# Patient Record
Sex: FEMALE | Race: WHITE | NOT HISPANIC OR LATINO | Employment: OTHER | URBAN - METROPOLITAN AREA
[De-identification: names, ages, dates, MRNs, and addresses within clinical notes are randomized per-mention and may not be internally consistent; named-entity substitution may affect disease eponyms.]

---

## 2017-03-20 PROBLEM — M25.561 ACUTE PAIN OF RIGHT KNEE: Status: ACTIVE | Noted: 2017-03-20

## 2017-03-29 PROBLEM — M17.0 PRIMARY OSTEOARTHRITIS OF BOTH KNEES: Status: ACTIVE | Noted: 2017-03-29

## 2017-03-29 PROBLEM — M76.31 ILIOTIBIAL BAND SYNDROME OF RIGHT SIDE: Status: ACTIVE | Noted: 2017-03-29

## 2017-09-19 ENCOUNTER — OFFICE VISIT (OUTPATIENT)
Dept: PHYSICAL MEDICINE AND REHAB | Facility: CLINIC | Age: 72
End: 2017-09-19
Payer: MEDICARE

## 2017-09-19 VITALS
HEIGHT: 64 IN | WEIGHT: 203 LBS | DIASTOLIC BLOOD PRESSURE: 71 MMHG | HEART RATE: 75 BPM | SYSTOLIC BLOOD PRESSURE: 125 MMHG | BODY MASS INDEX: 34.66 KG/M2

## 2017-09-19 DIAGNOSIS — M79.604 RIGHT LEG PAIN: Primary | ICD-10-CM

## 2017-09-19 DIAGNOSIS — M76.31 IT BAND SYNDROME, RIGHT: ICD-10-CM

## 2017-09-19 PROCEDURE — 99999 PR PBB SHADOW E&M-EST. PATIENT-LVL II: CPT | Mod: PBBFAC,,, | Performed by: PHYSICAL MEDICINE & REHABILITATION

## 2017-09-19 PROCEDURE — 3078F DIAST BP <80 MM HG: CPT | Mod: ,,, | Performed by: PHYSICAL MEDICINE & REHABILITATION

## 2017-09-19 PROCEDURE — 76942 ECHO GUIDE FOR BIOPSY: CPT | Mod: PBBFAC,PO | Performed by: PHYSICAL MEDICINE & REHABILITATION

## 2017-09-19 PROCEDURE — 3074F SYST BP LT 130 MM HG: CPT | Mod: ,,, | Performed by: PHYSICAL MEDICINE & REHABILITATION

## 2017-09-19 PROCEDURE — 99204 OFFICE O/P NEW MOD 45 MIN: CPT | Mod: 25,S$PBB,, | Performed by: PHYSICAL MEDICINE & REHABILITATION

## 2017-09-19 PROCEDURE — 1125F AMNT PAIN NOTED PAIN PRSNT: CPT | Mod: ,,, | Performed by: PHYSICAL MEDICINE & REHABILITATION

## 2017-09-19 PROCEDURE — 99212 OFFICE O/P EST SF 10 MIN: CPT | Mod: PBBFAC,PO,25 | Performed by: PHYSICAL MEDICINE & REHABILITATION

## 2017-09-19 PROCEDURE — 20550 NJX 1 TENDON SHEATH/LIGAMENT: CPT | Mod: S$PBB,,, | Performed by: PHYSICAL MEDICINE & REHABILITATION

## 2017-09-19 PROCEDURE — 1159F MED LIST DOCD IN RCRD: CPT | Mod: ,,, | Performed by: PHYSICAL MEDICINE & REHABILITATION

## 2017-09-19 RX ORDER — BETAMETHASONE SODIUM PHOSPHATE AND BETAMETHASONE ACETATE 3; 3 MG/ML; MG/ML
6 INJECTION, SUSPENSION INTRA-ARTICULAR; INTRALESIONAL; INTRAMUSCULAR; SOFT TISSUE
Status: DISCONTINUED | OUTPATIENT
Start: 2017-09-19 | End: 2017-09-20 | Stop reason: HOSPADM

## 2017-09-19 RX ADMIN — BETAMETHASONE ACETATE AND BETAMETHASONE SODIUM PHOSPHATE 6 MG: 3; 3 INJECTION, SUSPENSION INTRA-ARTICULAR; INTRALESIONAL; INTRAMUSCULAR; SOFT TISSUE at 03:09

## 2017-09-19 NOTE — LETTER
September 19, 2017      [place]  [address] [facility] [address]  [address]  [facility]  [facility] [address]           [facility]  [address] [facility] [address]  Phone: [phone]  Fax: [phone]          Patient: Meagan Flroes   MR Number: [id number]   YOB: 1945   Date of Visit: 9/19/2017       Dear Dr. Norberto Sagastume II:    Thank you for referring Meagan Flores to me for evaluation. Attached you will find relevant portions of my assessment and plan of care.    If you have questions, please do not hesitate to call me. I look forward to following Meagan Flores along with you.    Sincerely,    Sim Law MD    Enclosure  CC:  No Recipients    If you would like to receive this communication electronically, please contact [email or web address] or [phone] to request more information on [employer] access.    For providers and/or their staff who would like to refer a patient to [facility], please contact us through our one-stop-shop provider referral line, [facility], at [phone].    If you feel you have received this communication in error or would no longer like to receive these types of communications, please e-mail [email or web address]

## 2017-09-20 NOTE — PROCEDURES
Tendon Sheath  Date/Time: 9/19/2017 3:26 PM  Performed by: BRIAN PRICE  Authorized by: BRIAN PRICE     Consent Done?:  Yes (Verbal)  Timeout: prior to procedure the correct patient, procedure, and site was verified    Indications:  Pain  Site marked: the procedure site was marked    Timeout: prior to procedure the correct patient, procedure, and site was verified    Location: Distal IT band, just proximal to the lateral femoral condyle.  Prep: patient was prepped and draped in usual sterile fashion    Ultrasonic guidance for needle placement?: Yes    Needle size:  25 G  Approach:  Lateral (Needle in plane, distal to proximal)  Medications:  6 mg betamethasone acetate-betamethasone sodium phosphate 6 mg/mL  Patient tolerance:  Patient tolerated the procedure well with no immediate complications   Ultrasound guidance was used for correct needle placement, the images were saved will be uploaded to EMR.  The 25-gauge needle was used to inject approximately 10 cc of bacteriostatic saline both superficial and deep to the distal IT band.  Under ultrasound guidance the surrounding tissues were observed to dissect from the IT band both superficially and deep.  This was done along her point of maximal tenderness over the distal IT band just proximal to the lateral femoral condyle.  The needle was kept in place in the syringe was switched to that contained the corticosteroid.  The solution was then injected both superficially and deep to the IT band.

## 2017-09-20 NOTE — PROGRESS NOTES
[facility]    Consulting Provider: Dr. Norberto Sagastume II    CHIEF COMPLAINT:   Chief Complaint   Patient presents with    Knee Pain     Right IT band syndrome/ [place]     HISTORY OF PRESENT ILLNESS: Meagan Flores is a 72 y.o. female who presents to me for the first time for evaluation and treatment of right IT band syndrome.  The pain started about 8-9 months ago insidiously.  There was no known injury or trauma.  Her job requires prolonged periods of standing she feels this may have contributed to the issue.  She has done extensive physical therapy consisting of dry needling, TENS unit, massage, cupping, and ice.  These interventions to provide relief but temporary.  She rates her pain as a 3 on a scale of 1-10.  She locates the pain just proximal to the lateral aspect of the right knee.  She is a prescription for meloxicam but this does not provide much relief.    Review of Systems   Constitutional: Negative for fever.   HENT: Negative for drooling.    Eyes: Negative for discharge.   Respiratory: Negative for choking.    Cardiovascular: Negative for chest pain.   Genitourinary: Negative for flank pain.   Skin: Negative for wound.   Allergic/Immunologic: Negative for immunocompromised state.   Neurological: Negative for tremors and syncope.   Psychiatric/Behavioral: Negative for behavioral problems.     Past Medical History:   Past Medical History:   Diagnosis Date    Allergy     Anxiety     Arthritis     Cataract     Endometrial polyp     Fatty liver     Hyperlipidemia 8/18/2015    Hypertension 8/18/2015    Liver cyst     Mitral valve disorder     Sciatica        Past Surgical History:   Past Surgical History:   Procedure Laterality Date    DILATION AND CURETTAGE OF UTERUS      EYE SURGERY      cataract       Family History:   Family History   Problem Relation Age of Onset    Cancer Mother      breast    Heart disease Father        Medications:   Current Outpatient  "Prescriptions on File Prior to Visit   Medication Sig Dispense Refill    aspirin (ECOTRIN) 81 MG EC tablet Take 81 mg by mouth once daily.      BENICAR HCT 40-25 mg per tablet TAKE 1 TABLET BY MOUTH EVERY MORNING 90 tablet 1    buPROPion (WELLBUTRIN XL) 150 MG TB24 tablet TAKE 1 TABLET BY MOUTH EVERY DAY 90 tablet 1    carvedilol (COREG) 25 MG tablet TAKE 1 TABLET BY MOUTH TWICE DAILY 180 tablet 3    cholecalciferol, vitamin D3, 5,000 unit capsule Take 5,000 Units by mouth once daily.      coQ10, ubiquinol, 100 mg Cap Take 1 capsule by mouth once daily.      diclofenac sodium (VOLTAREN) 1 % Gel Apply 4 g topically 4 (four) times daily. 4 Tube 1    GLUCOSAM/CHOND/HYALU/CF BORATE (MOVE FREE JOINT HEALTH ORAL) Take 1 tablet by mouth 2 (two) times daily. Contains 1000 IU Vit D3 in each tab      krill-omega-3-dha-epa-lipids (KRILL OIL) 162-91-05-50 mg Cap Take 1 capsule by mouth once daily.      L. ACIDOPHILUS/PECTIN, CITRUS (ACIDOPHILUS PROBIOTIC ORAL) Take 1 capsule by mouth once daily.      meloxicam (MOBIC) 15 MG tablet TAKE 1/2 TO 1 TABLET BY MOUTH EVERY DAY AS NEEDED FOR ARTHRITIS 90 tablet 0    menthol-camphor-irrit cntr ir1 (KATLYN NATURAL PAIN RELIEVING) 3-3 % Gel Apply 1 application topically as needed.       No current facility-administered medications on file prior to visit.        Allergies: Review of patient's allergies indicates:  No Known Allergies    Social History:   Social History     Social History    Marital status: Single     Spouse name: N/A    Number of children: N/A    Years of education: N/A     Social History Main Topics    Smoking status: Never Smoker    Smokeless tobacco: Never Used    Alcohol use Yes      Comment: rarely    Drug use: No    Sexual activity: Not Currently     Other Topics Concern    None     Social History Narrative    None     PHYSICAL EXAMINATION:   General    Vitals:    09/19/17 1035   BP: 125/71   Pulse: 75   Weight: 92.1 kg (203 lb)   Height: 5' 4" " (1.626 m)     Constitutional: Oriented to person, place, and time. No apparent distress. Appears well-developed and well-nourished. Pleasant.  HENT:   Head: Normocephalic and atraumatic.   Eyes: Right eye exhibits no discharge. Left eye exhibits no discharge. No scleral icterus.   Pulmonary/Chest: Effort normal. No respiratory distress.   Abdominal: There is no guarding.   Neurological: Alert and oriented to person, place, and time.   Psychiatric: Behavior is normal.   Right Knee Exam     Tenderness   Right knee tenderness location: Tender along the course of the IT band just proximal to the lateral femoral condyle.    Range of Motion   Extension: 0   Flexion: 130     Tests   Eusebia:  Medial - negative Lateral - negative  Lachman:  Anterior - negative    Posterior - negative  Drawer:       Anterior - negative    Posterior - negative  Varus: negative  Valgus: negative  Patellar Apprehension: negative    Other   Erythema: absent  Scars: absent  Sensation: normal  Pulse: present  Swelling: none  Other tests: no effusion present      Left Knee Exam     Tenderness   The patient is experiencing no tenderness.         Range of Motion   Extension: normal   Flexion: normal     Other   Erythema: absent  Scars: absent  Sensation: normal  Pulse: present  Swelling: none  Effusion: no effusion present        INSPECTION: There is no swelling, ecchymoses, erythema or gross deformity about the right knee or thigh.  GAIT/DYNAMIC: Gait is preserved.    Imaging  X-ray of the right knee from 3/29/2017:  No displaced fracture or dislocation is appreciated.  Multi-compartmental chronic appearing degenerative changes are present right more so than left with near bone-on-bone appearance medial compartment.    Data Reviewed: X-ray    Supportive Actions: Independent visualization of images or test specimens    ASSESSMENT:   1. Right leg pain    2. IT band syndrome, right      PLAN:     1. Time was spent reviewing the above diagnosis in  "depth with Meagan today, including acute management and rehabilitation.     2.  Ms. Flores has distal IT band tendinitis that is been recalcitrant to conservative treatment in the form of extensive physical therapy, NSAIDs, and rest.  We discussed the utility performing an ultrasound-guided injection of corticosteroid with hydrodissection of the distal IT band.  I believe this is a reasonable option as she has failed more conservative treatments.  This procedure may help relieve adhesions of the distal IT band and reduce inflammation surrounding it in the hopes of longer term pain relief.  After discussion, she wished to proceed with this option, see separate procedure note.    3.  I encouraged her to continue her home exercise program working on IT band stretching.    4.  She'll follow-up with Dr. dane Simental going forward.  We also discussed the potential of using Botox to the IT band to promote tissue lengthening.  We may consider this option if today's procedure is unsuccessful.    This is a consult from Dr. Norebrto Sagastume II. Please see the "Communications" section of Epic to see how the consulting physician received the report of today's findings and recommendations. If it's an [facility] physician, it will be forwarded to his/her "in basket".    The above note was completed, in part, with the aid of Dragon dictation software/hardware. Translation errors may be present.    "

## 2019-04-23 PROBLEM — Z79.82 ASPIRIN LONG-TERM USE: Status: ACTIVE | Noted: 2019-04-23

## 2019-04-23 PROBLEM — Z79.82 ASPIRIN LONG-TERM USE: Status: RESOLVED | Noted: 2019-04-23 | Resolved: 2019-04-23

## 2019-10-01 PROBLEM — M79.651 RIGHT THIGH PAIN: Status: ACTIVE | Noted: 2019-10-01

## 2019-10-16 ENCOUNTER — OFFICE VISIT (OUTPATIENT)
Dept: PHYSICAL MEDICINE AND REHAB | Facility: CLINIC | Age: 74
End: 2019-10-16
Payer: MEDICARE

## 2019-10-16 VITALS
HEIGHT: 64 IN | SYSTOLIC BLOOD PRESSURE: 130 MMHG | BODY MASS INDEX: 33.46 KG/M2 | HEART RATE: 63 BPM | DIASTOLIC BLOOD PRESSURE: 60 MMHG | WEIGHT: 196 LBS

## 2019-10-16 DIAGNOSIS — M79.604 PAIN OF RIGHT LOWER EXTREMITY: Primary | ICD-10-CM

## 2019-10-16 PROCEDURE — 99999 PR PBB SHADOW E&M-EST. PATIENT-LVL III: ICD-10-PCS | Mod: PBBFAC,,, | Performed by: PHYSICAL MEDICINE & REHABILITATION

## 2019-10-16 PROCEDURE — 20552 NJX 1/MLT TRIGGER POINT 1/2: CPT | Mod: PBBFAC,PN | Performed by: PHYSICAL MEDICINE & REHABILITATION

## 2019-10-16 PROCEDURE — 99999 PR PBB SHADOW E&M-EST. PATIENT-LVL III: CPT | Mod: PBBFAC,,, | Performed by: PHYSICAL MEDICINE & REHABILITATION

## 2019-10-16 PROCEDURE — 76942 ECHO GUIDE FOR BIOPSY: CPT | Mod: PBBFAC,PN | Performed by: PHYSICAL MEDICINE & REHABILITATION

## 2019-10-16 PROCEDURE — 99214 OFFICE O/P EST MOD 30 MIN: CPT | Mod: 25,S$PBB,, | Performed by: PHYSICAL MEDICINE & REHABILITATION

## 2019-10-16 PROCEDURE — 20552 NJX 1/MLT TRIGGER POINT 1/2: CPT | Mod: S$PBB,,, | Performed by: PHYSICAL MEDICINE & REHABILITATION

## 2019-10-16 PROCEDURE — 20552 PR INJECT TRIGGER POINT, 1 OR 2: ICD-10-PCS | Mod: S$PBB,,, | Performed by: PHYSICAL MEDICINE & REHABILITATION

## 2019-10-16 PROCEDURE — 99213 OFFICE O/P EST LOW 20 MIN: CPT | Mod: PBBFAC,PN,25 | Performed by: PHYSICAL MEDICINE & REHABILITATION

## 2019-10-16 PROCEDURE — 76942: ICD-10-PCS | Mod: 26,S$PBB,, | Performed by: PHYSICAL MEDICINE & REHABILITATION

## 2019-10-16 PROCEDURE — 99214 PR OFFICE/OUTPT VISIT, EST, LEVL IV, 30-39 MIN: ICD-10-PCS | Mod: 25,S$PBB,, | Performed by: PHYSICAL MEDICINE & REHABILITATION

## 2019-10-16 RX ORDER — BETAMETHASONE SODIUM PHOSPHATE AND BETAMETHASONE ACETATE 3; 3 MG/ML; MG/ML
6 INJECTION, SUSPENSION INTRA-ARTICULAR; INTRALESIONAL; INTRAMUSCULAR; SOFT TISSUE
Status: DISCONTINUED | OUTPATIENT
Start: 2019-10-16 | End: 2019-10-16 | Stop reason: HOSPADM

## 2019-10-16 RX ADMIN — BETAMETHASONE SODIUM PHOSPHATE AND BETAMETHASONE ACETATE 6 MG: 3; 3 INJECTION, SUSPENSION INTRA-ARTICULAR; INTRALESIONAL; INTRAMUSCULAR at 11:10

## 2019-10-16 NOTE — PROCEDURES
Ultrasound-guided Dimple injection of the right hip adductor musculature  Date/Time: 10/16/2019 11:00 AM  Performed by: Sim Law MD  Authorized by: iSm Law MD     Consent Done?:  Yes (Verbal)  Timeout: prior to procedure the correct patient, procedure, and site was verified    Indications:  Pain and diagnostic evaluation  Site marked: the procedure site was marked    Timeout: prior to procedure the correct patient, procedure, and site was verified    Location: Right medial thigh at the sartorius and saphenous nerve.  Prep: patient was prepped and draped in usual sterile fashion    Ultrasonic guidance for needle placement?: Yes    Needle size:  25 G  Approach: Needle in plane, lateral to medial.  Medications:  6 mg betamethasone acetate-betamethasone sodium phosphate 6 mg/mL  Patient tolerance:  Patient tolerated the procedure well with no immediate complications   Ultrasound guidance was used for correct needle placement, the images were saved will be uploaded to EMR.

## 2019-10-16 NOTE — PROGRESS NOTES
[facility]    CHIEF COMPLAINT:   Chief Complaint   Patient presents with    Leg Pain     right leg pain     HISTORY OF PRESENT ILLNESS: Meagan Flores is a 74 y.o. female who presents to me today for evaluation of right medial thigh pain. Dr. Sagastume referred her to our clinic for nonsurgical management. She says this pain has been going on for about a year. It is an aching pain in the medial thigh about [place] between the knee and the pelvis. There was no injury or trauma to this area. It seems to improve with activity. She feels it all day and sometimes at night while lying in bed. It is best in the morning after sleeping. She denies any weakness, sensation changes, buckling of the knees, and popping/grinding. She cannot pinpoint any particular movements or activities which worsen her pain. She has been going to PT including dry needling at [facility] for this complaint which helps for a couple days at a time.     Review of Systems:   Constitutional: Negative for fever.   HENT: Negative for drooling.    Eyes: Negative for discharge.   Respiratory: Negative for choking.    Cardiovascular: Negative for chest pain.   Genitourinary: Negative for flank pain.   Skin: Negative for wound.   Allergic/Immunologic: Negative for immunocompromised state.   Neurological: Negative for tremors and syncope.   Psychiatric/Behavioral: Negative for behavioral problems.     Past Medical History:   Past Medical History:   Diagnosis Date    Allergy     Anxiety     Arthritis     Cataract     Endometrial polyp     Fatty liver     Hyperlipidemia 8/18/2015    Hyperlipidemia 8/18/2015    Hypertension 8/18/2015    Liver cyst     Mitral valve disorder     Sciatica        Past Surgical History:   Past Surgical History:   Procedure Laterality Date    DILATION AND CURETTAGE OF UTERUS      EYE SURGERY      cataract       Family History:   Family History   Problem Relation Age of Onset    Cancer Mother          breast    Heart disease Father        Medications:   Current Outpatient Medications on File Prior to Visit   Medication Sig Dispense Refill    aspirin (ECOTRIN) 81 MG EC tablet Take 81 mg by mouth once daily.      buPROPion (WELLBUTRIN XL) 150 MG TB24 tablet Take 1 tablet (150 mg total) by mouth once daily. 90 tablet 3    carvedilol (COREG) 25 MG tablet Take 1 tablet (25 mg total) by mouth 2 (two) times daily. 180 tablet 3    cholecalciferol, vitamin D3, 5,000 unit capsule Take 5,000 Units by mouth once daily.      coQ10, ubiquinol, 100 mg Cap Take 1 capsule by mouth once daily.      diclofenac sodium (VOLTAREN) 1 % Gel Apply 4 g topically 4 (four) times daily. (Patient taking differently: Apply 4 g topically as needed. ) 4 Tube 1    GLUCOSAM/CHOND/HYALU/CF BORATE (MOVE FREE JOINT HEALTH ORAL) Take 1 tablet by mouth 2 (two) times daily. Contains 1000 IU Vit D3 in each tab      krill-omega-3-dha-epa-lipids (KRILL OIL) 945-71-55-50 mg Cap Take 1 capsule by mouth once daily.      L. ACIDOPHILUS/PECTIN, CITRUS (ACIDOPHILUS PROBIOTIC ORAL) Take 1 capsule by mouth once daily.      menthol-camphor-irrit cntr ir1 (KATLYN NATURAL PAIN RELIEVING) 3-3 % Gel Apply 1 application topically as needed.      olmesartan-hydrochlorothiazide (BENICAR HCT) 40-25 mg per tablet Take 1 tablet by mouth every morning. 90 tablet 3    RESTASIS 0.05 % ophthalmic emulsion INT 1 GTT IN OU BID  0    UNABLE TO FIND medication name: Tumeric       No current facility-administered medications on file prior to visit.        Allergies: Review of patient's allergies indicates:  No Known Allergies    Social History:   Social History     Socioeconomic History    Marital status: Single     Spouse name: Not on file    Number of children: Not on file    Years of education: Not on file    Highest education level: Not on file   Occupational History    Not on file   Social Needs    Financial resource strain: Not on file    Food insecurity:  "    Worry: Not on file     Inability: Not on file    Transportation needs:     Medical: Not on file     Non-medical: Not on file   Tobacco Use    Smoking status: Never Smoker    Smokeless tobacco: Never Used   Substance and Sexual Activity    Alcohol use: Yes     Comment: rarely    Drug use: No    Sexual activity: Not Currently   Lifestyle    Physical activity:     Days per week: Not on file     Minutes per session: Not on file    Stress: Not on file   Relationships    Social connections:     Talks on phone: Not on file     Gets together: Not on file     Attends Alevism service: Not on file     Active member of club or organization: Not on file     Attends meetings of clubs or organizations: Not on file     Relationship status: Not on file   Other Topics Concern    Not on file   Social History Narrative    Not on file     PHYSICAL EXAMINATION:   General    Vitals:    10/16/19 1100   BP: 130/60   Pulse: 63   Weight: 88.9 kg (196 lb)   Height: 5' 4" (1.626 m)     Constitutional: Oriented to person, place, and time. No apparent distress. Pleasant.  HENT:   Head: Normocephalic and atraumatic.   Eyes: Right eye exhibits no discharge. Left eye exhibits no discharge. No scleral icterus.   Pulmonary/Chest: Effort normal. No respiratory distress.   Abdominal: There is no guarding.   Neurological: Alert and oriented to person, place, and time.   Psychiatric: Behavior is normal.   Right Ankle Exam     Comments:  Midfoot bony hypertrophy      Right Knee Exam     Tenderness   The patient is experiencing tenderness in the medial joint line (TTP in the distal 2/3 of the medial thigh. Palpable cord of tender musculature).    Range of Motion   The patient has normal right knee ROM.    Tests   Eusebia:  Medial - negative Lateral - negative  Varus: negative Valgus: negative  Drawer:  Anterior - negative    Posterior - negative  Patellar apprehension: negative    Other   Erythema: absent  Scars: absent  Sensation: " normal  Swelling: none    Comments:  No pain with resisted knee flexion or hip adduction        Left Knee Exam   Left knee exam is normal.        INSPECTION: There is no swelling, ecchymoses, erythema or gross deformity about the right leg.  GAIT/DYNAMIC: Steady nonantalgic.    Imaging  X-ray Knee Ortho Right with Flexion   Order: [id number]   Status:  Final result   Visible to patient:  Yes (Patient Portal)   Next appt:  10/28/2019 at 01:15 PM in Internal Medicine (Lali Kearney MD)   Dx:  Chronic pain of right knee   Details     Reading Physician Reading Date Result Priority   Deo Garvin MD 10/1/2019       Narrative     EXAMINATION:  XR KNEE ORTHO RIGHT WITH FLEXION 10/01/2019 at 13:33    INDICATION:  Clinical history is right medial knee pain chronically.  No history of recent trauma or surgery is provided.    COMPARISON  Knee radiograph of 03/29/2017    FINDINGS  No gross soft tissue defect or radiopaque foreign body is appreciated. Osseous alignment appears maintained with no displaced fracture or dislocation appreciated.  Trace joint effusion is appreciated.  There is multi compartmental spurring and narrowing present similar distribution overall medially predominant bilaterally.  Patellar alignment appears maintained on the sunrise view.  Posterior fabella is present.  Correlate with the patient's clinical findings.  No significant interval changes are appreciated.    IMPRESSION  There are chronic multi compartmental degenerative changes present with spurring and narrowing medially predominant.  No interval displaced fracture or dislocation is appreciated.           Data Reviewed: X-ray    Supportive Actions: Independent visualization of images or test specimens    ASSESSMENT:   1. Pain of right lower extremity      PLAN:     1. Time was spent reviewing the above diagnosis in depth with Meagan today, including acute management and rehabilitation.     2. We discussed treatment options for this patient's  likely adductor strain with possible saphenous neuritis today including physical therapy, corticosteroid injection, and conservative management. After obtaining ultrasound visualization of her right adductor complex, we saw a hyperechoic area correlating with significant point tenderness.  This could represent a chronic muscle strain. After discussion of the potential risks, benefits, and respective outcomes with the patient, we decided an ultrasound-guided corticosteroid injection was most appropriate at this time. Should this intervention fail to manage her symptoms, she may return to clinic. We will consider advanced imaging and/or repeat injection at that time.    The above note was completed, in part, with the aid of Dragon dictation software/hardware. Translation errors may be present.

## 2019-10-28 PROBLEM — E55.9 VITAMIN D DEFICIENCY: Status: ACTIVE | Noted: 2019-10-28

## 2019-10-28 PROBLEM — R73.9 HYPERGLYCEMIA: Status: ACTIVE | Noted: 2019-10-28

## 2019-12-26 ENCOUNTER — TELEPHONE (OUTPATIENT)
Dept: PHYSICAL MEDICINE AND REHAB | Facility: CLINIC | Age: 74
End: 2019-12-26

## 2019-12-26 NOTE — TELEPHONE ENCOUNTER
----- Message from Jose Hernández MA sent at 12/26/2019 10:17 AM CST -----  Contact: Patient   Patient would like to schedule will injection and is leaving town on 1/7, can we schedule  before then   Call back

## 2019-12-26 NOTE — TELEPHONE ENCOUNTER
Spoke with patient states outside of right knee and behind knee hurting. Leaving to go out of town 1/8/20. Was wondering if she could come in for injection. Informed will send a message since hasnt been 3 months since last injection but since different area should be ok. Nothing available until 1/8/20. Informed can put on wait list unless Dr BOWSER has a cancellation.

## 2020-01-06 ENCOUNTER — OFFICE VISIT (OUTPATIENT)
Dept: PHYSICAL MEDICINE AND REHAB | Facility: CLINIC | Age: 75
End: 2020-01-06
Payer: MEDICARE

## 2020-01-06 VITALS — HEIGHT: 64 IN | BODY MASS INDEX: 34.31 KG/M2 | WEIGHT: 201 LBS

## 2020-01-06 DIAGNOSIS — M76.891 HAMSTRING TENDINITIS OF RIGHT THIGH: ICD-10-CM

## 2020-01-06 DIAGNOSIS — M71.21 BAKER'S CYST OF KNEE, RIGHT: ICD-10-CM

## 2020-01-06 PROCEDURE — 99214 PR OFFICE/OUTPT VISIT, EST, LEVL IV, 30-39 MIN: ICD-10-PCS | Mod: 25,S$PBB,GC, | Performed by: PHYSICAL MEDICINE & REHABILITATION

## 2020-01-06 PROCEDURE — 99999 PR PBB SHADOW E&M-EST. PATIENT-LVL III: CPT | Mod: PBBFAC,,, | Performed by: PHYSICAL MEDICINE & REHABILITATION

## 2020-01-06 PROCEDURE — 99214 OFFICE O/P EST MOD 30 MIN: CPT | Mod: 25,S$PBB,GC, | Performed by: PHYSICAL MEDICINE & REHABILITATION

## 2020-01-06 PROCEDURE — 20611 LARGE JOINT ASPIRATION/INJECTION: R KNEE: ICD-10-PCS | Mod: S$PBB,RT,, | Performed by: PHYSICAL MEDICINE & REHABILITATION

## 2020-01-06 PROCEDURE — 20611 DRAIN/INJ JOINT/BURSA W/US: CPT | Mod: PBBFAC,PN | Performed by: PHYSICAL MEDICINE & REHABILITATION

## 2020-01-06 PROCEDURE — 99999 PR PBB SHADOW E&M-EST. PATIENT-LVL III: ICD-10-PCS | Mod: PBBFAC,,, | Performed by: PHYSICAL MEDICINE & REHABILITATION

## 2020-01-06 PROCEDURE — 1125F PR PAIN SEVERITY QUANTIFIED, PAIN PRESENT: ICD-10-PCS | Mod: ,,, | Performed by: PHYSICAL MEDICINE & REHABILITATION

## 2020-01-06 PROCEDURE — 99213 OFFICE O/P EST LOW 20 MIN: CPT | Mod: PBBFAC,PN | Performed by: PHYSICAL MEDICINE & REHABILITATION

## 2020-01-06 PROCEDURE — 1159F PR MEDICATION LIST DOCUMENTED IN MEDICAL RECORD: ICD-10-PCS | Mod: ,,, | Performed by: PHYSICAL MEDICINE & REHABILITATION

## 2020-01-06 PROCEDURE — 1125F AMNT PAIN NOTED PAIN PRSNT: CPT | Mod: ,,, | Performed by: PHYSICAL MEDICINE & REHABILITATION

## 2020-01-06 PROCEDURE — 1159F MED LIST DOCD IN RCRD: CPT | Mod: ,,, | Performed by: PHYSICAL MEDICINE & REHABILITATION

## 2020-01-06 RX ORDER — DICLOFENAC SODIUM 10 MG/G
4 GEL TOPICAL 3 TIMES DAILY PRN
Qty: 4 TUBE | Refills: 1 | Status: SHIPPED | OUTPATIENT
Start: 2020-01-06

## 2020-01-06 NOTE — PROCEDURES
Large Joint Aspiration/Injection: R knee  Date/Time: 1/6/2020 3:30 PM  Performed by: Sim Law MD  Authorized by: Sim Law MD     Consent Done?:  Yes (Verbal)  Indications:  Pain  Procedure site marked: Yes    Timeout: Prior to procedure the correct patient, procedure, and site was verified    Anesthesia  Local anesthesia used  Anesthesia: local infiltration  Anesthetic: lidocaine 1% without epinephrine  Anesthetic total: 3mL    Location:  Knee  Site:  R knee (Baker cyst)  Prep: Patient was prepped and draped in usual sterile fashion    Needle size:  18 G  Ultrasonic Guidance for needle placement: Yes  Images are saved and documented.  Approach: Needle in plane, distal to proximal.  Aspirate amount (ml):  1  Aspirate:  Serous  Patient tolerance:  Patient tolerated the procedure well with no immediate complications    Additional Comments: Ultrasound guidance was used for correct needle placement, the images were saved will be uploaded to EMR.

## 2020-01-06 NOTE — PROGRESS NOTES
[facility]    CHIEF COMPLAINT:   Chief Complaint   Patient presents with    Leg Pain     right     HISTORY OF PRESENT ILLNESS: Meagan Flores is a 74 y.o. female who presents to me today for right leg/knee pain.  I last saw her about 10 weeks ago where we performed injection of the right thigh.  She reports significant reduction in her pain following that procedure.  She notes over the past few weeks she has been experiencing a different type of pain that is more over the distal/medial thigh radiating just past the knee medially.  She also has some degree of posterior knee pain.  She rates her pain as an 8 on a scale of 1-10.  She has been performing routine stretching exercises.  She denies any numbness, tingling, or swelling of the right knee or lower extremity.  Her pain is increased with prolonged standing or walking.    Previous HPI  Dr. Sagastume referred her to our clinic for nonsurgical management. She says this pain has been going on for about a year. It is an aching pain in the medial thigh about prison between the knee and the pelvis. There was no injury or trauma to this area. It seems to improve with activity. She feels it all day and sometimes at night while lying in bed. It is best in the morning after sleeping. She denies any weakness, sensation changes, buckling of the knees, and popping/grinding. She cannot pinpoint any particular movements or activities which worsen her pain. She has been going to PT including dry needling at [facility] for this complaint which helps for a couple days at a time.     Review of Systems:   Constitutional: Negative for fever.   HENT: Negative for drooling.    Eyes: Negative for discharge.   Respiratory: Negative for choking.    Cardiovascular: Negative for chest pain.   Genitourinary: Negative for flank pain.   Skin: Negative for wound.   Allergic/Immunologic: Negative for immunocompromised state.   Neurological: Negative for tremors and  syncope.   Psychiatric/Behavioral: Negative for behavioral problems.     Past Medical History:   Past Medical History:   Diagnosis Date    Allergy     Anxiety     Arthritis     Cataract     Endometrial polyp     Fatty liver     Hyperlipidemia 8/18/2015    Hyperlipidemia 8/18/2015    Hypertension 8/18/2015    Liver cyst     Mitral valve disorder     Sciatica        Past Surgical History:   Past Surgical History:   Procedure Laterality Date    DILATION AND CURETTAGE OF UTERUS      EYE SURGERY      cataract       Family History:   Family History   Problem Relation Age of Onset    Cancer Mother         breast    Heart disease Father        Medications:   Current Outpatient Medications on File Prior to Visit   Medication Sig Dispense Refill    aspirin (ECOTRIN) 81 MG EC tablet Take 81 mg by mouth once daily.      buPROPion (WELLBUTRIN XL) 150 MG TB24 tablet Take 1 tablet (150 mg total) by mouth once daily. 90 tablet 3    carvedilol (COREG) 25 MG tablet Take 1 tablet (25 mg total) by mouth 2 (two) times daily. 180 tablet 3    cholecalciferol, vitamin D3, 5,000 unit capsule Take 5,000 Units by mouth once daily.      clindamycin-benzoyl peroxide (BENZACLIN PUMP) 1-5 % GlwP as needed.   5    coQ10, ubiquinol, 100 mg Cap Take 1 capsule by mouth once daily.      GLUCOSAM/CHOND/HYALU/CF BORATE (MOVE FREE JOINT HEALTH ORAL) Take 1 tablet by mouth 2 (two) times daily. Contains 1000 IU Vit D3 in each tab      imiquimod (ALDARA) 5 % cream as needed.   0    krill-omega-3-dha-epa-lipids (KRILL OIL) 319-94-26-50 mg Cap Take 1 capsule by mouth once daily.      L. ACIDOPHILUS/PECTIN, CITRUS (ACIDOPHILUS PROBIOTIC ORAL) Take 1 capsule by mouth once daily.      menthol-camphor-irrit cntr ir1 (KATLYN NATURAL PAIN RELIEVING) 3-3 % Gel Apply 1 application topically as needed.      olmesartan-hydrochlorothiazide (BENICAR HCT) 40-25 mg per tablet Take 1 tablet by mouth every morning. 90 tablet 3    RESTASIS 0.05 %  "ophthalmic emulsion INT 1 GTT IN OU BID  0    [DISCONTINUED] diclofenac sodium (VOLTAREN) 1 % Gel Apply 4 g topically 4 (four) times daily. (Patient taking differently: Apply 4 g topically as needed. ) 4 Tube 1     No current facility-administered medications on file prior to visit.        Allergies: Review of patient's allergies indicates:  No Known Allergies    Social History:   Social History     Socioeconomic History    Marital status: Single     Spouse name: Not on file    Number of children: Not on file    Years of education: Not on file    Highest education level: Not on file   Occupational History    Not on file   Social Needs    Financial resource strain: Not on file    Food insecurity:     Worry: Not on file     Inability: Not on file    Transportation needs:     Medical: Not on file     Non-medical: Not on file   Tobacco Use    Smoking status: Never Smoker    Smokeless tobacco: Never Used   Substance and Sexual Activity    Alcohol use: Yes     Comment: rarely    Drug use: No    Sexual activity: Not Currently   Lifestyle    Physical activity:     Days per week: Not on file     Minutes per session: Not on file    Stress: Not on file   Relationships    Social connections:     Talks on phone: Not on file     Gets together: Not on file     Attends Buddhism service: Not on file     Active member of club or organization: Not on file     Attends meetings of clubs or organizations: Not on file     Relationship status: Not on file   Other Topics Concern    Not on file   Social History Narrative    Not on file     PHYSICAL EXAMINATION:   General    Vitals:    01/06/20 1528   Weight: 91.2 kg (201 lb)   Height: 5' 4" (1.626 m)     Constitutional: Oriented to person, place, and time. No apparent distress. Pleasant.  HENT:   Head: Normocephalic and atraumatic.   Eyes: Right eye exhibits no discharge. Left eye exhibits no discharge. No scleral icterus.   Pulmonary/Chest: Effort normal. No respiratory " distress.   Abdominal: There is no guarding.   Neurological: Alert and oriented to person, place, and time.   Psychiatric: Behavior is normal.   Right Ankle Exam     Comments:  Midfoot bony hypertrophy      Right Knee Exam     Tenderness   The patient is experiencing tenderness in the medial joint line and pes anserinus (TTP in the distal 1/3 of the medial thigh, additionally over the popliteal fossa).    Range of Motion   Extension: 0   Flexion: 130 (Posterior knee pain with terminal flexion)     Tests   Eusebia:  Medial - negative Lateral - negative  Varus: negative Valgus: negative  Drawer:  Anterior - negative    Posterior - negative  Patellar apprehension: negative    Other   Erythema: absent  Scars: absent  Sensation: normal  Pulse: present  Swelling: none  Effusion: no effusion present    Comments:  No pain with resisted knee flexion or hip adduction        Left Knee Exam   Left knee exam is normal.        INSPECTION: There is no swelling, ecchymoses, erythema or gross deformity about the right leg/knee.  GAIT/DYNAMIC: Steady nonantalgic.    Imaging  X-ray Knee Ortho Right with Flexion        Narrative     EXAMINATION:  XR KNEE ORTHO RIGHT WITH FLEXION 10/01/2019 at 13:33    INDICATION:  Clinical history is right medial knee pain chronically.  No history of recent trauma or surgery is provided.    COMPARISON  Knee radiograph of 03/29/2017    FINDINGS  No gross soft tissue defect or radiopaque foreign body is appreciated. Osseous alignment appears maintained with no displaced fracture or dislocation appreciated.  Trace joint effusion is appreciated.  There is multi compartmental spurring and narrowing present similar distribution overall medially predominant bilaterally.  Patellar alignment appears maintained on the sunrise view.  Posterior fabella is present.  Correlate with the patient's clinical findings.  No significant interval changes are appreciated.    IMPRESSION  There are chronic multi compartmental  degenerative changes present with spurring and narrowing medially predominant.  No interval displaced fracture or dislocation is appreciated.           Data Reviewed: X-ray    Supportive Actions: Independent visualization of images or test specimens    ASSESSMENT:   1. Baker's cyst of knee, right    2. Hamstring tendinitis of right thigh      PLAN:     1.  Brief diagnostic ultrasound examination of the right knee did confirm the presence of a small Baker cyst.  She did have posterior knee pain with terminal flexion we discussed that this could be a pain generator for her.  She wished to proceed with ultrasound-guided aspiration injection of the Baker cyst.  See separate procedure note.  We did back feel with a small amount of corticosteroid once the cyst was decompressed.    2.  She also has pain along the course of the distal hip adductor and medial hamstring muscles/tendons.  I recommended she continue her physical therapy and home stretching routinely.  Also prescribed topical Voltaren gel to apply to painful areas as needed.    3.  Return to clinic in 4-6 weeks if not improved.    The above note was completed, in part, with the aid of Dragon dictation software/hardware. Translation errors may be present.

## 2020-06-26 DIAGNOSIS — U07.1 COVID-19 VIRUS DETECTED: ICD-10-CM

## 2020-12-21 ENCOUNTER — PATIENT MESSAGE (OUTPATIENT)
Dept: OTHER | Facility: OTHER | Age: 75
End: 2020-12-21

## 2021-01-11 ENCOUNTER — IMMUNIZATION (OUTPATIENT)
Dept: FAMILY MEDICINE | Facility: CLINIC | Age: 76
End: 2021-01-11
Payer: MEDICARE

## 2021-01-11 DIAGNOSIS — Z23 NEED FOR VACCINATION: ICD-10-CM

## 2021-01-11 PROCEDURE — 91300 COVID-19, MRNA, LNP-S, PF, 30 MCG/0.3 ML DOSE VACCINE: CPT | Mod: PBBFAC | Performed by: INTERNAL MEDICINE

## 2021-02-01 ENCOUNTER — IMMUNIZATION (OUTPATIENT)
Dept: FAMILY MEDICINE | Facility: CLINIC | Age: 76
End: 2021-02-01
Payer: MEDICARE

## 2021-02-01 DIAGNOSIS — Z23 NEED FOR VACCINATION: Primary | ICD-10-CM

## 2021-02-01 PROCEDURE — 91300 COVID-19, MRNA, LNP-S, PF, 30 MCG/0.3 ML DOSE VACCINE: CPT | Mod: PBBFAC,PO

## 2021-02-01 PROCEDURE — 0002A COVID-19, MRNA, LNP-S, PF, 30 MCG/0.3 ML DOSE VACCINE: CPT | Mod: PBBFAC,PO

## 2021-04-10 PROBLEM — M75.21 TENDONITIS OF LONG HEAD OF BICEPS BRACHII OF RIGHT SHOULDER: Status: ACTIVE | Noted: 2021-04-10

## 2021-04-20 PROBLEM — S46.101D INJURY OF TENDON OF LONG HEAD OF BICEPS, RIGHT, SUBSEQUENT ENCOUNTER: Status: ACTIVE | Noted: 2021-04-20

## 2021-04-21 ENCOUNTER — OFFICE VISIT (OUTPATIENT)
Dept: PHYSICAL MEDICINE AND REHAB | Facility: CLINIC | Age: 76
End: 2021-04-21
Payer: MEDICARE

## 2021-04-21 VITALS — BODY MASS INDEX: 33.29 KG/M2 | WEIGHT: 195 LBS | HEIGHT: 64 IN

## 2021-04-21 DIAGNOSIS — M75.21 TENDONITIS OF LONG HEAD OF BICEPS BRACHII OF RIGHT SHOULDER: Primary | ICD-10-CM

## 2021-04-21 PROCEDURE — 76942 TENDON SHEATH: ICD-10-PCS | Mod: 26,S$PBB,, | Performed by: PHYSICAL MEDICINE & REHABILITATION

## 2021-04-21 PROCEDURE — 99213 PR OFFICE/OUTPT VISIT, EST, LEVL III, 20-29 MIN: ICD-10-PCS | Mod: 25,S$PBB,GC, | Performed by: PHYSICAL MEDICINE & REHABILITATION

## 2021-04-21 PROCEDURE — 76942 ECHO GUIDE FOR BIOPSY: CPT | Mod: 26,S$PBB,, | Performed by: PHYSICAL MEDICINE & REHABILITATION

## 2021-04-21 PROCEDURE — 20550 TENDON SHEATH: ICD-10-PCS | Mod: S$PBB,RT,, | Performed by: PHYSICAL MEDICINE & REHABILITATION

## 2021-04-21 PROCEDURE — 99213 OFFICE O/P EST LOW 20 MIN: CPT | Mod: 25,S$PBB,GC, | Performed by: PHYSICAL MEDICINE & REHABILITATION

## 2021-04-21 PROCEDURE — 99213 OFFICE O/P EST LOW 20 MIN: CPT | Mod: PBBFAC,PN | Performed by: PHYSICAL MEDICINE & REHABILITATION

## 2021-04-21 PROCEDURE — 99999 PR PBB SHADOW E&M-EST. PATIENT-LVL III: ICD-10-PCS | Mod: PBBFAC,,, | Performed by: PHYSICAL MEDICINE & REHABILITATION

## 2021-04-21 PROCEDURE — 99999 PR PBB SHADOW E&M-EST. PATIENT-LVL III: CPT | Mod: PBBFAC,,, | Performed by: PHYSICAL MEDICINE & REHABILITATION

## 2021-04-21 PROCEDURE — 20550 NJX 1 TENDON SHEATH/LIGAMENT: CPT | Mod: PBBFAC,PN,RT | Performed by: PHYSICAL MEDICINE & REHABILITATION

## 2021-04-21 RX ORDER — BETAMETHASONE SODIUM PHOSPHATE AND BETAMETHASONE ACETATE 3; 3 MG/ML; MG/ML
6 INJECTION, SUSPENSION INTRA-ARTICULAR; INTRALESIONAL; INTRAMUSCULAR; SOFT TISSUE
Status: DISCONTINUED | OUTPATIENT
Start: 2021-04-21 | End: 2021-04-21 | Stop reason: HOSPADM

## 2021-04-21 RX ADMIN — BETAMETHASONE SODIUM PHOSPHATE AND BETAMETHASONE ACETATE 6 MG: 3; 3 INJECTION, SUSPENSION INTRA-ARTICULAR; INTRALESIONAL; INTRAMUSCULAR at 11:04

## 2021-10-04 ENCOUNTER — IMMUNIZATION (OUTPATIENT)
Dept: FAMILY MEDICINE | Facility: CLINIC | Age: 76
End: 2021-10-04
Payer: MEDICARE

## 2021-10-04 DIAGNOSIS — Z23 NEED FOR VACCINATION: Primary | ICD-10-CM

## 2021-10-04 PROCEDURE — 91300 COVID-19, MRNA, LNP-S, PF, 30 MCG/0.3 ML DOSE VACCINE: CPT | Mod: PBBFAC,PO

## 2021-10-04 PROCEDURE — 0003A COVID-19, MRNA, LNP-S, PF, 30 MCG/0.3 ML DOSE VACCINE: CPT | Mod: PBBFAC | Performed by: FAMILY MEDICINE

## 2024-02-01 ENCOUNTER — TELEPHONE (OUTPATIENT)
Dept: PRIMARY CARE CLINIC | Facility: CLINIC | Age: 79
End: 2024-02-01
Payer: MEDICARE

## 2024-02-01 NOTE — TELEPHONE ENCOUNTER
----- Message from Monisha Greer sent at 2/1/2024  8:25 AM CST -----  Contact: [phone]  Patient states she was contacted by the office to romaine an appt to 2/28/24 at 1:30.  Appt does not show in her portal, patient is expecting the 2/28/24 appt. Patient states she keeps getting messages about earlier appts. Please call and advise.    Thank you and have a great day.

## 2024-02-01 NOTE — TELEPHONE ENCOUNTER
Called patient and told her that I do not see the 2/28/24 at 130p, only the 4/11/24 at 130p appt. Patient stated she's been receiving notifications through the portal for earlier appts, but hadn't clicked them because she didn't know she had to. Patient asked me to call the patient scheduled on 2/28/24 and ask if they can switch, but I told her that is not an option at this time, but if that patient cancels, then the patient may grab the appt quickly if the notification pops up on the portal. Patient was initially perturbed, but ultimately understood that the waiting list is in place for Dr Cuellar for a reason, and there are other people trying to see her at the same time, or earlier. Patient voiced understanding and was advised to continue care with her PCP until she can establish with Dr Cuellar.

## 2024-02-01 NOTE — TELEPHONE ENCOUNTER
----- Message from Monisha Greer sent at 2/1/2024  8:25 AM CST -----  Contact: [phone]  Patient states she was contacted by the office to romaine an appt to 2/28/24 at 1:30.  Appt does not show in her portal, patient is expecting the 2/28/24 appt. Patient states she keeps getting messages about earlier appts. Please call and advise.    Thank you and have a great day.

## 2024-04-09 ENCOUNTER — TELEPHONE (OUTPATIENT)
Dept: PRIMARY CARE CLINIC | Facility: CLINIC | Age: 79
End: 2024-04-09
Payer: MEDICARE

## 2024-04-09 PROBLEM — Z76.89 ENCOUNTER TO ESTABLISH CARE: Status: ACTIVE | Noted: 2024-04-09

## 2024-04-09 NOTE — TELEPHONE ENCOUNTER
Epic notified the patient that there was a sooner spot available. However this will be a new patient for Dr. Cuellar and the spot was only a 20 minute slot. I spoke with the patient and moved her back to 4/11/24, her original appointment date and time. She verbalized understanding.

## 2024-04-11 ENCOUNTER — OFFICE VISIT (OUTPATIENT)
Dept: PRIMARY CARE CLINIC | Facility: CLINIC | Age: 79
End: 2024-04-11
Payer: MEDICARE

## 2024-04-11 VITALS
SYSTOLIC BLOOD PRESSURE: 132 MMHG | DIASTOLIC BLOOD PRESSURE: 78 MMHG | WEIGHT: 192.69 LBS | HEIGHT: 64 IN | OXYGEN SATURATION: 97 % | HEART RATE: 71 BPM | BODY MASS INDEX: 32.9 KG/M2

## 2024-04-11 DIAGNOSIS — I10 ESSENTIAL HYPERTENSION: ICD-10-CM

## 2024-04-11 DIAGNOSIS — Z79.02 ANTIPLATELET OR ANTITHROMBOTIC LONG-TERM USE: ICD-10-CM

## 2024-04-11 DIAGNOSIS — M17.0 PRIMARY OSTEOARTHRITIS OF BOTH KNEES: ICD-10-CM

## 2024-04-11 DIAGNOSIS — D03.9 MELANOMA IN SITU, UNSPECIFIED SITE: ICD-10-CM

## 2024-04-11 DIAGNOSIS — Z76.89 ENCOUNTER TO ESTABLISH CARE: Primary | ICD-10-CM

## 2024-04-11 DIAGNOSIS — E55.9 VITAMIN D DEFICIENCY: ICD-10-CM

## 2024-04-11 DIAGNOSIS — M76.31 ILIOTIBIAL BAND SYNDROME OF RIGHT SIDE: ICD-10-CM

## 2024-04-11 DIAGNOSIS — F32.0 MAJOR DEPRESSIVE DISORDER, SINGLE EPISODE, MILD: ICD-10-CM

## 2024-04-11 DIAGNOSIS — F32.A MILD DEPRESSION: ICD-10-CM

## 2024-04-11 DIAGNOSIS — M15.9 PRIMARY OSTEOARTHRITIS INVOLVING MULTIPLE JOINTS: ICD-10-CM

## 2024-04-11 DIAGNOSIS — E04.9 ENLARGED THYROID: ICD-10-CM

## 2024-04-11 DIAGNOSIS — R01.1 HEART MURMUR: ICD-10-CM

## 2024-04-11 DIAGNOSIS — K76.0 FATTY LIVER: ICD-10-CM

## 2024-04-11 DIAGNOSIS — E78.2 MIXED HYPERLIPIDEMIA: ICD-10-CM

## 2024-04-11 DIAGNOSIS — Z78.0 MENOPAUSE: ICD-10-CM

## 2024-04-11 PROCEDURE — 99213 OFFICE O/P EST LOW 20 MIN: CPT | Mod: PBBFAC,PN | Performed by: INTERNAL MEDICINE

## 2024-04-11 PROCEDURE — 99205 OFFICE O/P NEW HI 60 MIN: CPT | Mod: S$PBB,,, | Performed by: INTERNAL MEDICINE

## 2024-04-11 PROCEDURE — 99999 PR PBB SHADOW E&M-EST. PATIENT-LVL III: CPT | Mod: PBBFAC,,, | Performed by: INTERNAL MEDICINE

## 2024-04-11 RX ORDER — FAMOTIDINE 20 MG/1
20 TABLET, FILM COATED ORAL NIGHTLY PRN
COMMUNITY

## 2024-04-11 RX ORDER — LEVOCETIRIZINE DIHYDROCHLORIDE 5 MG/1
5 TABLET, FILM COATED ORAL NIGHTLY
COMMUNITY

## 2024-04-11 NOTE — PROGRESS NOTES
[facility] INITIAL VISIT NOTE      CHIEF COMPLAINT     Chief Complaint   Patient presents with    [facility]       HPI     Meagan Flores is a 79 y.o.  female who presents with a PMHx of  HTN, HLD, anxiety and depression, fatty liver, MVP, sciatica, melanoma who presents to establish care.   Her main complaints and concerns today are: questions about refills.     ANXIETY: says her mood is good. Was started on wellbutrin 15 years ago for relationship issues. Told patient she may try to wean off and see how   GAD7 not done.   Denies SI/HI    Depression: see above for mood.     History of skin cancer: her last visit with dermatologist was less than 6 months ago.    CHRONIC HEALTH ISSUES:   Past Medical History:  Past Medical History:   Diagnosis Date    Allergy     Anxiety     Arthritis     Cataract     COVID-19 06/22/2020    Endometrial polyp     Fatty liver     Hyperlipidemia 8/18/2015    Hyperlipidemia 8/18/2015    Hypertension 8/18/2015    Liver cyst     Mitral valve disorder     Sciatica          Geriatric Assessment    ADLs : independent  iADLs: independent    Polypharmacy:no more than 5 medications.     Visual impairments: no changes     Hearing impairment: no changes     Urinary incontinence: no     Malnutrition: no     Gait/balance/falls: no falls. Normal balance     Depression: PHQ2. Negative. No depression.     Sleep: no insomnia.     Cognitive Problems: minicog.no min icog dog. No FH.     Environmental/social problems: live alone. No rugs. Grab bars in the bathroom. Gun away.     Functional status:     Support system:     Advanced care planning:  does not have on file. But discussed at length today and she will fill out the paperwork and return it to clinic to be scanned in.    Date: 04/11/2024    Power of   I initiated the process of voluntary advance care planning today and explained the importance of this process to the patient.  I introduced the concept of advance  directives to the patient, as well. Then the patient received detailed information about the importance of designating a Health Care Power of  (HCPOA). She was also instructed to communicate with this person about their wishes for future healthcare, should she become sick and lose decision-making capacity. The patient has not previously appointed a HCPOA. After our discussion, the patient has decided to complete a HCPOA and has appointed her significant other, health care agent:  shivani kunz  & health care agent number:  on file  I spent a total time of 10 minutes discussing this issue with the patient.            Past Surgical History:  Past Surgical History:   Procedure Laterality Date    COLONOSCOPY  04/24/2014    Dr. Mazariegos    DILATION AND CURETTAGE OF UTERUS      EYE SURGERY      cataract       Allergies:  Review of patient's allergies indicates:  No Known Allergies    Home Medications:  Prior to Admission medications    Medication Sig Start Date End Date Taking? Authorizing Provider   aspirin (ECOTRIN) 81 MG EC tablet Take 81 mg by mouth once daily.    Provider, Historical   buPROPion (WELLBUTRIN XL) 150 MG TB24 tablet TAKE 1 TABLET(150 MG) BY MOUTH EVERY DAY 6/22/23   Lali Kearney MD   carvediloL (COREG) 25 MG tablet Take 1 tablet (25 mg total) by mouth 2 (two) times daily. 1/11/24   Llai Kearney MD   cholecalciferol, vitamin D3, 5,000 unit capsule Take 5,000 Units by mouth once daily.    Provider, Historical   clindamycin-benzoyl peroxide (BENZACLIN PUMP) 1-5 % GlwP as needed.  10/23/19   Provider, Historical   coQ10, ubiquinol, 100 mg Cap Take 1 capsule by mouth once daily.    Provider, Historical   diclofenac sodium (VOLTAREN) 1 % Gel Apply 4 g topically 3 (three) times daily as needed. 1/6/20   Sim Law MD   ibuprofen (ADVIL,MOTRIN) 800 MG tablet TAKE 1 TABLET(800 MG) BY MOUTH TWICE DAILY AS NEEDED FOR PAIN 11/14/23   Mikaela Tidwell, CAMMIE   imiquimod (ALDARA) 5 % cream as  "needed.  10/23/19   Provider, Historical   L. ACIDOPHILUS/PECTIN, CITRUS (ACIDOPHILUS PROBIOTIC ORAL) Take 1 capsule by mouth once daily.    Provider, Historical   menthol-camphor-irrit cntr ir1 3-3 % Gel Apply 1 application topically as needed.    Provider, Historical   olmesartan-hydrochlorothiazide (BENICAR HCT) 40-25 mg per tablet TAKE 1 TABLET BY MOUTH EVERY MORNING 10/24/23   Mikaela Tidwell, CAMMIE   RESTASIS 0.05 % ophthalmic emulsion INT 1 GTT IN OU BID 4/4/19   Provider, Historical       Family History:  Family History   Problem Relation Age of Onset    Cancer Mother         breast    Heart disease Father        Social History:  Social History     Tobacco Use    Smoking status: Never    Smokeless tobacco: Never   Substance Use Topics    Alcohol use: Yes     Comment: rarely    Drug use: No       Review of Systems:  ROS      PHYSICAL EXAM     /78 (BP Location: Right arm, Patient Position: Sitting, BP Method: Medium (Manual))   Pulse 71   Ht 5' 4" (1.626 m)   Wt 87.4 kg (192 lb 10.9 oz)   LMP 08/04/1996   SpO2 97%   BMI 33.07 kg/m²     GEN - A+OX4, NAD   HEENT - PERRL, EOMI, OP clear. MMM.   Neck - No thyromegaly or cervical LAD. No thyroid masses felt.  CV - RRR, no m/r   Chest - CTAB, no wheezing or rhonchi  Abd - S/NT/ND/+BS.   Ext - 2+BDP and radial pulses. No LE edema.   Neuro - PERRL, EOMI, no nystagmus, eyebrow raise, facial sensation, hearing, m of mastication, smile, palatal raise, shoulder shrug, tongue protrusion symmetric and intact. 5/5 BUE and BLE strength. Sensation to light touch intact throughout. 2+ DTRs. Normal gait.   MSK - No spinal tenderness to palpation. Normal gait.   Skin - No rash.    LABS     Previous labs reviewed.      ASSESSMENT/PLAN     Meagan Flores is a 79 y.o. female with  1. Encounter to establish care  -medications reviewed and consolidated  -reviewed PMH, medications, HCM including vaccinations.   -reviewed most recent lab work. Reordered as needed. Discussed " abnormalities with patient with time allowed for questions.   -reviewed clinic policy with patient including to arrive 15 mins before appointments, labs and results including expected turn around for results.   -outside records and consultants notes reviewed as time allowed. Updated treatment team with name of other providers.   -reviewed and confirmed patients contact information, preferred pharmacy etc.   -AVS provided after visit to summarized things discussed.   -The following assessments were completed:  Living Situation  CAGE  Depression Screening  Timed Get Up and Go  Cognitive Function Screening-Minicog   Nutrition Screening  ADL Screening      2. Mild depression  Overview:  Stable and doing well   Discussed weaning off wellburtrin, pt will let us know if she decides  Denies SI/HI     Orders:  -     CBC Auto Differential; Future; Expected date: 04/11/2024    3. Essential hypertension  Overview:  BP well controlled. Continue current medications   Limit salt intake   Encouraged more exercise     Orders:  -     CBC Auto Differential; Future; Expected date: 04/11/2024  -     Comprehensive Metabolic Panel; Future; Expected date: 04/11/2024  -     TSH; Future; Expected date: 04/11/2024    4. Mixed hyperlipidemia  Overview:  Well controlled   Limit cholesterol in diet     Orders:  -     CBC Auto Differential; Future; Expected date: 04/11/2024  -     Lipid Panel; Future; Expected date: 04/11/2024    5. Antiplatelet or antithrombotic long-term use  -     CBC Auto Differential; Future; Expected date: 04/11/2024    6. Vitamin D deficiency  Overview:  Continue with vitamin d       7. Fatty liver  US to monitor   Consume low carb diet     8. Primary osteoarthritis involving multiple joints  Pain is well controlled     9. Primary osteoarthritis of both knees  Pain well controlled     10. Iliotibial band syndrome of right side    11. Melanoma in situ, unspecified site  Overview:  Follows with dermatology       12. Major  depressive disorder, single episode, mild  Overview:  Stable and doing well   Discussed weaning off wellburtrin, pt will let us know if she decides  Denies SI/HI       13. Menopause  -     DXA Bone Density Axial Skeleton 1 or more sites; Future; Expected date: 04/11/2024    14. Heart murmur  Overview:  Will get an ECHO.     Orders:  -     Echo; Future    15. Enlarged thyroid  Overview:  US thyroid ordered     Orders:  -     US Soft Tissue Head Neck; Future; Expected date: 04/11/2024         ACP: discussion had about ACP to include definition of ACP, HCP, CODE STATUS. Paperwork handed to patient, to review, discuss with family and return it to clinic to be scanned into the chart.   Advance Care Planning             My total time spent on this encounter was at least 60 minutes which included  the following activities: preparing to see the patient, performing a medically appropriate and/or evaluation, counseling and educating the patient and family/caregiver, ordering medications, tests, or procedures, referring and communicating with other healthcare providers, documenting clinical information in the electronic or other health record. This time is independent and non-overlapping.         RTC in 3 months, sooner if needed and depending on labs.    Iris Cuellar MD  Board Certified Internist/Geriatrician  [facility] ([place])      Answers submitted by the patient for this visit:  Review of Systems Questionnaire (Submitted on 4/10/2024)  activity change: No  unexpected weight change: No  rhinorrhea: No  trouble swallowing: No  visual disturbance: No  chest tightness: No  polyuria: No  difficulty urinating: No  menstrual problem: No  arthralgias: Yes  confusion: No  dysphoric mood: No

## 2024-04-23 ENCOUNTER — HOSPITAL ENCOUNTER (OUTPATIENT)
Dept: CARDIOLOGY | Facility: HOSPITAL | Age: 79
Discharge: HOME | End: 2024-04-23
Attending: INTERNAL MEDICINE
Payer: MEDICARE

## 2024-04-23 ENCOUNTER — HOSPITAL ENCOUNTER (OUTPATIENT)
Dept: RADIOLOGY | Facility: HOSPITAL | Age: 79
Discharge: HOME | End: 2024-04-23
Attending: INTERNAL MEDICINE
Payer: MEDICARE

## 2024-04-23 ENCOUNTER — TELEPHONE (OUTPATIENT)
Dept: PHYSICAL MEDICINE AND REHAB | Facility: CLINIC | Age: 79
End: 2024-04-23
Payer: MEDICARE

## 2024-04-23 VITALS — BODY MASS INDEX: 32.78 KG/M2 | HEIGHT: 64 IN | WEIGHT: 192 LBS

## 2024-04-23 DIAGNOSIS — R01.1 HEART MURMUR: ICD-10-CM

## 2024-04-23 DIAGNOSIS — E04.9 ENLARGED THYROID: ICD-10-CM

## 2024-04-23 DIAGNOSIS — Z78.0 MENOPAUSE: ICD-10-CM

## 2024-04-23 LAB
ASCENDING AORTA: 3.02 CM
AV INDEX (PROSTH): 0.67
AV MEAN GRADIENT: 5 MMHG
AV PEAK GRADIENT: 9 MMHG
AV REGURGITATION PRESSURE HALF TIME: 528.2 MS
AV VALVE AREA BY VELOCITY RATIO: 2.34 CM²
AV VALVE AREA: 2.35 CM²
AV VELOCITY RATIO: 0.66
BSA FOR ECHO PROCEDURE: 1.98 M2
CV ECHO LV RWT: 0.68 CM
DOP CALC AO PEAK VEL: 1.46 M/S
DOP CALC AO VTI: 32.7 CM
DOP CALC LVOT AREA: 3.5 CM2
DOP CALC LVOT DIAMETER: 2.12 CM
DOP CALC LVOT PEAK VEL: 0.97 M/S
DOP CALC LVOT STROKE VOLUME: 76.91 CM3
DOP CALCLVOT PEAK VEL VTI: 21.8 CM
E WAVE DECELERATION TIME: 172.91 MSEC
E/A RATIO: 0.72
E/E' RATIO: 11.38 M/S
ECHO LV POSTERIOR WALL: 1.39 CM (ref 0.6–1.1)
FRACTIONAL SHORTENING: 37 % (ref 28–44)
INTERVENTRICULAR SEPTUM: 1.16 CM (ref 0.6–1.1)
LEFT ATRIUM SIZE: 4.15 CM
LEFT ATRIUM VOLUME INDEX MOD: 30.4 ML/M2
LEFT ATRIUM VOLUME MOD: 58.45 CM3
LEFT INTERNAL DIMENSION IN SYSTOLE: 2.59 CM (ref 2.1–4)
LEFT VENTRICLE DIASTOLIC VOLUME INDEX: 38.64 ML/M2
LEFT VENTRICLE DIASTOLIC VOLUME: 74.18 ML
LEFT VENTRICLE MASS INDEX: 98 G/M2
LEFT VENTRICLE SYSTOLIC VOLUME INDEX: 12.7 ML/M2
LEFT VENTRICLE SYSTOLIC VOLUME: 24.31 ML
LEFT VENTRICULAR INTERNAL DIMENSION IN DIASTOLE: 4.1 CM (ref 3.5–6)
LEFT VENTRICULAR MASS: 187.93 G
LV LATERAL E/E' RATIO: 8.22 M/S
LV SEPTAL E/E' RATIO: 18.5 M/S
LVOT MG: 1.91 MMHG
LVOT MV: 0.63 CM/S
MV PEAK A VEL: 1.03 M/S
MV PEAK E VEL: 0.74 M/S
OHS CV RV/LV RATIO: 0.87 CM
PISA AR MAX VEL: 4.09 M/S
PISA TR MAX VEL: 2.2 M/S
PULM VEIN S/D RATIO: 1.83
PV PEAK D VEL: 0.36 M/S
PV PEAK S VEL: 0.66 M/S
RA PRESSURE ESTIMATED: 3 MMHG
RIGHT VENTRICULAR END-DIASTOLIC DIMENSION: 3.57 CM
RIGHT VENTRICULAR LENGTH IN DIASTOLE (APICAL 4-CHAMBER VIEW): 6.98 CM
RV MID DIAMA: 2.13 CM
RV TB RVSP: 5 MMHG
RV TISSUE DOPPLER FREE WALL SYSTOLIC VELOCITY 1 (APICAL 4 CHAMBER VIEW): 13.75 CM/S
SINUS: 2.66 CM
STJ: 2.89 CM
TDI LATERAL: 0.09 M/S
TDI SEPTAL: 0.04 M/S
TDI: 0.07 M/S
TR MAX PG: 19 MMHG
TRICUSPID ANNULAR PLANE SYSTOLIC EXCURSION: 3.54 CM
TV REST PULMONARY ARTERY PRESSURE: 22 MMHG
Z-SCORE OF LEFT VENTRICULAR DIMENSION IN END DIASTOLE: -2.79
Z-SCORE OF LEFT VENTRICULAR DIMENSION IN END SYSTOLE: -2

## 2024-04-23 PROCEDURE — 93306 TTE W/DOPPLER COMPLETE: CPT | Mod: PO

## 2024-04-23 PROCEDURE — 93306 TTE W/DOPPLER COMPLETE: CPT | Mod: 26,,, | Performed by: INTERNAL MEDICINE

## 2024-04-23 PROCEDURE — 76536 US EXAM OF HEAD AND NECK: CPT | Mod: 26,,, | Performed by: RADIOLOGY

## 2024-04-23 PROCEDURE — 76536 US EXAM OF HEAD AND NECK: CPT | Mod: TC,PO

## 2024-04-23 PROCEDURE — 77080 DXA BONE DENSITY AXIAL: CPT | Mod: TC,PO

## 2024-04-23 PROCEDURE — 77080 DXA BONE DENSITY AXIAL: CPT | Mod: 26,,, | Performed by: RADIOLOGY

## 2024-04-23 NOTE — TELEPHONE ENCOUNTER
Called and spoke with pt. She needed an apt to get injections in her adducter muscles. I advised the pt that Dr Dumont does not do those injections and she needed to get an apt with Ortho. Pt V/U     ----- Message from Kori Bowman sent at 4/23/2024  2:09 PM CDT -----  Regarding: appointment  Contact: patient  Type:  Sooner Appointment Request    Caller is requesting a sooner appointment.  Caller declined first available appointment listed below.  Caller will not accept being placed on the waitlist and is requesting a message be sent to doctor.    Name of Caller:  patient  When is the first available appointment?    Symptoms:  leg pain  Would the patient rather a call back or a response via [employer]? call  Best Call Back Number:  [phone] (home)     Additional Information:  Please call patient to advise.  Thanks!

## 2024-04-24 ENCOUNTER — TELEPHONE (OUTPATIENT)
Dept: PRIMARY CARE CLINIC | Facility: CLINIC | Age: 79
End: 2024-04-24
Payer: MEDICARE

## 2024-04-24 DIAGNOSIS — M79.606: Primary | ICD-10-CM

## 2024-04-24 NOTE — TELEPHONE ENCOUNTER
----- Message from Iris Cuellar MD sent at 4/24/2024  7:48 AM CDT -----  Cardiac ECHO shows no major abnormalities    Patient has an appointment  October 14 he will keep it   To follow up with test results and sladeo

## 2024-04-24 NOTE — PROGRESS NOTES
US thryoid shows: Small multinodular thyroid gland.  No thyroid nodules which meet the criteria for FNA/core biopsy.  No concerning lymphadenopathy in the neck.

## 2024-04-24 NOTE — TELEPHONE ENCOUNTER
"Patient was informed of her test results. She c/o inner thigh pain. She states she has had the pain before in 2019 and saw a Dr. Law, "a sports medicine specialist" who has now retired, who gave her an injection for the pain. Dr. Law is no longer with [facility], so the patient wants to be referred to the doctor who replaced him. She states she doesn't "want to start from scratch."    "

## 2024-04-24 NOTE — TELEPHONE ENCOUNTER
----- Message from Iris Cuellar MD sent at 4/24/2024  7:48 AM CDT -----  Bone density s hows normal density of bones which is fantastic jersno for age.

## 2024-04-24 NOTE — TELEPHONE ENCOUNTER
----- Message from Iris Cuellar MD sent at 4/24/2024  7:47 AM CDT -----  US thryoid shows: Small multinodular thyroid gland.  No thyroid nodules which meet the criteria for FNA/core biopsy.  No concerning lymphadenopathy in the neck.

## 2024-04-24 NOTE — TELEPHONE ENCOUNTER
----- Message from Iris Cuellar MD sent at 4/24/2024  7:48 AM CDT -----  Cardiac ECHO shows no major abnormalities

## 2024-05-09 RX ORDER — BUPROPION HYDROCHLORIDE 150 MG/1
150 TABLET ORAL DAILY
Qty: 90 TABLET | Refills: 1 | Status: SHIPPED | OUTPATIENT
Start: 2024-05-09

## 2024-05-31 ENCOUNTER — OFFICE VISIT (OUTPATIENT)
Dept: PHYSICAL MEDICINE AND REHAB | Facility: CLINIC | Age: 79
End: 2024-05-31
Payer: MEDICARE

## 2024-05-31 VITALS — WEIGHT: 198 LBS | HEIGHT: 64 IN | BODY MASS INDEX: 33.8 KG/M2

## 2024-05-31 DIAGNOSIS — M79.606: ICD-10-CM

## 2024-05-31 DIAGNOSIS — M79.18 MYALGIA, OTHER SITE: ICD-10-CM

## 2024-05-31 DIAGNOSIS — S76.212A STRAIN OF ADDUCTOR MUSCLE, FASCIA AND TENDON OF LEFT THIGH, INITIAL ENCOUNTER: Primary | ICD-10-CM

## 2024-05-31 PROCEDURE — 99999PBSHW PR PBB SHADOW TECHNICAL ONLY FILED TO HB: Mod: PBBFAC,,,

## 2024-05-31 PROCEDURE — 76942 ECHO GUIDE FOR BIOPSY: CPT | Mod: PBBFAC,PO | Performed by: STUDENT IN AN ORGANIZED HEALTH CARE EDUCATION/TRAINING PROGRAM

## 2024-05-31 PROCEDURE — 99999 PR PBB SHADOW E&M-EST. PATIENT-LVL IV: CPT | Mod: PBBFAC,,, | Performed by: STUDENT IN AN ORGANIZED HEALTH CARE EDUCATION/TRAINING PROGRAM

## 2024-05-31 PROCEDURE — 99214 OFFICE O/P EST MOD 30 MIN: CPT | Mod: PBBFAC,PO | Performed by: STUDENT IN AN ORGANIZED HEALTH CARE EDUCATION/TRAINING PROGRAM

## 2024-05-31 PROCEDURE — 20553 NJX 1/MLT TRIGGER POINTS 3/>: CPT | Mod: PBBFAC,PO | Performed by: STUDENT IN AN ORGANIZED HEALTH CARE EDUCATION/TRAINING PROGRAM

## 2024-05-31 RX ORDER — TRIAMCINOLONE ACETONIDE 1 MG/G
CREAM TOPICAL
COMMUNITY
Start: 2024-01-11

## 2024-05-31 RX ORDER — PREDNISOLONE ACETATE 10 MG/ML
SUSPENSION/ DROPS OPHTHALMIC
COMMUNITY

## 2024-05-31 RX ORDER — DICLOFENAC SODIUM 1 MG/ML
SOLUTION/ DROPS OPHTHALMIC
COMMUNITY

## 2024-05-31 RX ORDER — CIPROFLOXACIN HYDROCHLORIDE 3 MG/ML
SOLUTION/ DROPS OPHTHALMIC
COMMUNITY

## 2024-05-31 RX ORDER — ZOSTER VACCINE RECOMBINANT, ADJUVANTED 50 MCG/0.5
KIT INTRAMUSCULAR
COMMUNITY

## 2024-05-31 RX ORDER — BETAMETHASONE SODIUM PHOSPHATE AND BETAMETHASONE ACETATE 3; 3 MG/ML; MG/ML
6 INJECTION, SUSPENSION INTRA-ARTICULAR; INTRALESIONAL; INTRAMUSCULAR; SOFT TISSUE
Status: DISCONTINUED | OUTPATIENT
Start: 2024-05-31 | End: 2024-05-31 | Stop reason: HOSPADM

## 2024-05-31 RX ADMIN — BETAMETHASONE ACETATE AND BETAMETHASONE SODIUM PHOSPHATE 6 MG: 3; 3 INJECTION, SUSPENSION INTRA-ARTICULAR; INTRALESIONAL; INTRAMUSCULAR; SOFT TISSUE at 10:05

## 2024-05-31 NOTE — PROCEDURES
Trigger Point Injection    Performed by: Rafat Jackman MD  Authorized by: Rafat Jackman MD      Consent Done?:  Yes (Verbal)    Pre-Procedure:   Indications:  Pain and pain relief  Site marked: left adductor complex x 3.     Timeout: prior to procedure the correct patient, procedure, and site was verified        Local anesthesia used?: No    Local anesthetic:  Lidocaine 1% without epinephrine  Anesthetic total (ml):  2 (US guidance utilized for todays injection. Hyperechoic area seen at location of pain.)    Medications: 6 mg betamethasone acetate-betamethasone sodium phosphate 6 mg/mL

## 2024-05-31 NOTE — ASSESSMENT & PLAN NOTE
She has failed PT  TPI to the left adductor complex x 3 with US guidance.   Continue with therapy as this has provided some improvement.   RTC 3 months or sooner if needed. Consider repeat injection at that time.

## 2024-05-31 NOTE — PROGRESS NOTES
PHYSICAL MEDICINE AND REHABILITATION  New Patient Consult:    Subjective:   Chief Complaint:    Chief Complaint   Patient presents with    Leg Pain       HPI: Meagan Flores is a 79 y.o. female with  has a past medical history of Allergy, Anxiety, Arthritis, Cataract, COVID-19 (06/22/2020), Endometrial polyp, Fatty liver, Hyperlipidemia (8/18/2015), Hyperlipidemia (8/18/2015), Hypertension (8/18/2015), Liver cyst, Mitral valve disorder, and Sciatica. She was sent to me for consultation for Leg Pain   Today,  she complains of left leg pain for the past 2 months. No hx of trauma. She has a history of a chronic adductor strain on the right for which she was given an injection in the past. This resolved her pain. She has been in therapy who has been performing dry needling and therapy without sufficient relief. Denies any assoc symptoms. Pain is 6/10 and worse with sitting and walking. It is TTP. She has assoc stiffness. Previous injection was with betamethasone.     Review of Systems  See hpi    Imaging/Diagnostic Studies  none    Past Medical History:   Diagnosis Date    Allergy     Anxiety     Arthritis     Cataract     COVID-19 06/22/2020    Endometrial polyp     Fatty liver     Hyperlipidemia 8/18/2015    Hyperlipidemia 8/18/2015    Hypertension 8/18/2015    Liver cyst     Mitral valve disorder     Sciatica        Past Surgical History:   Procedure Laterality Date    COLONOSCOPY  04/24/2014    Dr. Mazariegos    DILATION AND CURETTAGE OF UTERUS      EYE SURGERY      cataract       Review of patient's allergies indicates:  No Known Allergies    Current Outpatient Medications   Medication Sig Dispense Refill    aspirin (ECOTRIN) 81 MG EC tablet Take 81 mg by mouth once daily.      buPROPion (WELLBUTRIN XL) 150 MG TB24 tablet Take 1 tablet (150 mg total) by mouth once daily. 90 tablet 1    carvediloL (COREG) 25 MG tablet Take 1 tablet (25 mg total) by mouth 2 (two) times daily. 180 tablet 3    cholecalciferol, vitamin  "D3, 5,000 unit capsule Take 5,000 Units by mouth once daily.      ciprofloxacin HCl (CILOXAN) 0.3 % ophthalmic solution INSTILL 1 DROP QID IN OPERATED EYE 3  DAYS BEFORE SURGERY      clindamycin-benzoyl peroxide (BENZACLIN PUMP) 1-5 % GlwP as needed.   5    coQ10, ubiquinol, 100 mg Cap Take 1 capsule by mouth once daily.      diclofenac (VOLTAREN) 0.1 % ophthalmic solution       diclofenac sodium (VOLTAREN) 1 % Gel Apply 4 g topically 3 (three) times daily as needed. 4 Tube 1    famotidine (PEPCID) 20 MG tablet Take 20 mg by mouth nightly as needed for Heartburn.      ibuprofen (ADVIL,MOTRIN) 800 MG tablet TAKE 1 TABLET(800 MG) BY MOUTH TWICE DAILY AS NEEDED FOR PAIN 45 tablet 2    imiquimod (ALDARA) 5 % cream as needed.   0    L. ACIDOPHILUS/PECTIN, CITRUS (ACIDOPHILUS PROBIOTIC ORAL) Take 1 capsule by mouth once daily.      levocetirizine (XYZAL) 5 MG tablet Take 5 mg by mouth every evening.      menthol-camphor-irrit cntr ir1 3-3 % Gel Apply 1 application topically as needed.      olmesartan-hydrochlorothiazide (BENICAR HCT) 40-25 mg per tablet TAKE 1 TABLET BY MOUTH EVERY MORNING 90 tablet 3    prednisoLONE acetate (PRED FORTE) 1 % DrpS       RESTASIS 0.05 % ophthalmic emulsion INT 1 GTT IN OU BID  0    triamcinolone acetonide 0.1% (KENALOG) 0.1 % cream Apply topically.      varicella-zoster gE-AS01B, PF, (SHINGRIX, PF,) 50 mcg/0.5 mL injection        No current facility-administered medications for this visit.       Family History   Problem Relation Name Age of Onset    Cancer Mother          breast    Heart disease Father         Social History     Socioeconomic History    Marital status: Single   Tobacco Use    Smoking status: Never    Smokeless tobacco: Never   Substance and Sexual Activity    Alcohol use: Yes     Comment: rarely    Drug use: No    Sexual activity: Not Currently     Birth control/protection: Post-menopausal         Objective:    Ht 5' 4" (1.626 m)   Wt 89.8 kg (198 lb)   LMP 08/04/1996   " BMI 33.99 kg/m²   Physical Exam  Vitals and nursing note reviewed.   Constitutional:       Appearance: Normal appearance.   HENT:      Head: Normocephalic.   Eyes:      Extraocular Movements: Extraocular movements intact.   Cardiovascular:      Rate and Rhythm: Normal rate.      Pulses: Normal pulses.   Pulmonary:      Effort: Pulmonary effort is normal.   Abdominal:      General: Abdomen is flat.   Skin:     General: Skin is warm and dry.   Neurological:      General: No focal deficit present.      Mental Status: She is alert and oriented to person, place, and time. Mental status is at baseline.   Psychiatric:         Mood and Affect: Mood normal.         Behavior: Behavior normal.         Thought Content: Thought content normal.        Left Hip Exam     Tenderness   Left hip tenderness location: medial thigh.    Range of Motion   The patient has normal left hip ROM.    Muscle Strength   Abduction: 5/5   Adduction: 5/5   Flexion: 5/5     Tests   FLAVIO: negative  Jn: negative    Other   Erythema: absent  Scars: absent  Sensation: normal  Pulse: present               Assessment:       ICD-10-CM ICD-9-CM    1. Strain of adductor muscle, fascia and tendon of left thigh, initial encounter  S76.212A 843.8 ULS US Guidance for Needle Placement      Trigger Point Injection      DISCONTINUED: betamethasone acetate-betamethasone sodium phosphate injection 6 mg      2. Lower extremity pain, medial, unspecified laterality  M79.606 729.5 Ambulatory referral/consult to Physical Medicine Rehab      3. Myalgia, other site  M79.18 729.1 ULS US Guidance for Needle Placement      Trigger Point Injection            Plan:   1. Strain of adductor muscle, fascia and tendon of left thigh, initial encounter  Assessment & Plan:  She has failed PT  TPI to the left adductor complex x 3 with US guidance.   Continue with therapy as this has provided some improvement.   RTC 3 months or sooner if needed. Consider repeat injection at that  time.    Orders:  -     ULS US Guidance for Needle Placement  -     Trigger Point Injection  -     Discontinue: betamethasone acetate-betamethasone sodium phosphate injection 6 mg    2. Lower extremity pain, medial, unspecified laterality  -     Ambulatory referral/consult to Physical Medicine Rehab    3. Myalgia, other site  -     ULS US Guidance for Needle Placement  -     Trigger Point Injection      Rafat Jackman MD  Physical Medicine & Rehabilitation     Disclaimer:  This note may have been prepared using voice recognition software, it may have not been extensively proofed, as such there could be errors within the text such as sound alike errors.  Contact the author of this note for clarification.

## 2024-06-10 ENCOUNTER — TELEPHONE (OUTPATIENT)
Dept: PRIMARY CARE CLINIC | Facility: CLINIC | Age: 79
End: 2024-06-10
Payer: MEDICARE

## 2024-06-17 PROBLEM — M79.18 MYALGIA, OTHER SITE: Status: ACTIVE | Noted: 2024-06-17

## 2024-07-06 DIAGNOSIS — I10 ESSENTIAL HYPERTENSION: ICD-10-CM

## 2024-07-08 RX ORDER — CARVEDILOL 25 MG/1
25 TABLET ORAL 2 TIMES DAILY
Qty: 180 TABLET | Refills: 3 | Status: SHIPPED | OUTPATIENT
Start: 2024-07-08

## 2024-07-24 ENCOUNTER — OFFICE VISIT (OUTPATIENT)
Dept: PRIMARY CARE CLINIC | Facility: CLINIC | Age: 79
End: 2024-07-24
Payer: MEDICARE

## 2024-07-24 VITALS
OXYGEN SATURATION: 95 % | WEIGHT: 198.44 LBS | DIASTOLIC BLOOD PRESSURE: 74 MMHG | HEIGHT: 64 IN | SYSTOLIC BLOOD PRESSURE: 150 MMHG | HEART RATE: 66 BPM | BODY MASS INDEX: 33.88 KG/M2 | TEMPERATURE: 99 F

## 2024-07-24 DIAGNOSIS — K76.0 FATTY LIVER: ICD-10-CM

## 2024-07-24 DIAGNOSIS — E55.9 VITAMIN D DEFICIENCY: ICD-10-CM

## 2024-07-24 DIAGNOSIS — M17.0 PRIMARY OSTEOARTHRITIS OF BOTH KNEES: ICD-10-CM

## 2024-07-24 DIAGNOSIS — E04.9 ENLARGED THYROID: ICD-10-CM

## 2024-07-24 DIAGNOSIS — M79.606: ICD-10-CM

## 2024-07-24 DIAGNOSIS — F32.A MILD DEPRESSION: ICD-10-CM

## 2024-07-24 DIAGNOSIS — F32.0 MAJOR DEPRESSIVE DISORDER, SINGLE EPISODE, MILD: ICD-10-CM

## 2024-07-24 DIAGNOSIS — R19.8 FULLNESS OF ABDOMEN: ICD-10-CM

## 2024-07-24 DIAGNOSIS — M15.9 PRIMARY OSTEOARTHRITIS INVOLVING MULTIPLE JOINTS: ICD-10-CM

## 2024-07-24 DIAGNOSIS — E78.2 MIXED HYPERLIPIDEMIA: ICD-10-CM

## 2024-07-24 DIAGNOSIS — I10 ESSENTIAL HYPERTENSION: Primary | ICD-10-CM

## 2024-07-24 PROCEDURE — 99215 OFFICE O/P EST HI 40 MIN: CPT | Mod: S$PBB,,, | Performed by: INTERNAL MEDICINE

## 2024-07-24 PROCEDURE — 99215 OFFICE O/P EST HI 40 MIN: CPT | Mod: PBBFAC,PN | Performed by: INTERNAL MEDICINE

## 2024-07-24 PROCEDURE — 99999 PR PBB SHADOW E&M-EST. PATIENT-LVL V: CPT | Mod: PBBFAC,,, | Performed by: INTERNAL MEDICINE

## 2024-07-24 NOTE — PROGRESS NOTES
INTERNAL MEDICINE PROGRESS/URGENT CARE NOTE    CHIEF COMPLAINT     Chief Complaint   Patient presents with    Follow-up     Patient is here for a 3 month follow up. She denies any current complaints. She wants to discuss her most recent lab results.        NELLY Flores is a 79 y.o.  female who presents with a PMHx of  HTN, HLD, anxiety and depression, fatty liver, MVP, sciatica, melanoma who presents today for routine follow up.       Her main complaints and concerns today are:   Abdominal bloating. No pain. No change in bowels but has family history of RCC in her mom so a bit concerned. Will order US     At her previous visit, she had some questions about refills.      ANXIETY: says her mood is good. Was started on wellbutrin 15 years ago for relationship issues. Told patient she may try to wean off and see how   GAD7 not done.   Denies SI/HI     Depression: see above for mood.      History of skin cancer: her last visit with dermatologist was less than 6 months ago.      Home Medications:  Prior to Admission medications    Medication Sig Start Date End Date Taking? Authorizing Provider   aspirin (ECOTRIN) 81 MG EC tablet Take 81 mg by mouth once daily.    Provider, Historical   buPROPion (WELLBUTRIN XL) 150 MG TB24 tablet Take 1 tablet (150 mg total) by mouth once daily. 5/9/24   Iris Cuellar MD   carvediloL (COREG) 25 MG tablet TAKE 1 TABLET BY MOUTH TWICE DAILY 7/8/24   Iris Cuellar MD   cholecalciferol, vitamin D3, 5,000 unit capsule Take 5,000 Units by mouth once daily.    Provider, Historical   ciprofloxacin HCl (CILOXAN) 0.3 % ophthalmic solution INSTILL 1 DROP QID IN OPERATED EYE 3  DAYS BEFORE SURGERY    Provider, Historical   clindamycin-benzoyl peroxide (BENZACLIN PUMP) 1-5 % GlwP as needed.  10/23/19   Provider, Historical   coQ10, ubiquinol, 100 mg Cap Take 1 capsule by mouth once daily.    Provider, Historical   diclofenac (VOLTAREN) 0.1 % ophthalmic solution      Provider, Historical   diclofenac sodium (VOLTAREN) 1 % Gel Apply 4 g topically 3 (three) times daily as needed. 1/6/20   Sim Law MD   famotidine (PEPCID) 20 MG tablet Take 20 mg by mouth nightly as needed for Heartburn.    Provider, Historical   ibuprofen (ADVIL,MOTRIN) 800 MG tablet TAKE 1 TABLET(800 MG) BY MOUTH TWICE DAILY AS NEEDED FOR PAIN 11/14/23   Mikaela Tidwell FNP   imiquimod (ALDARA) 5 % cream as needed.  10/23/19   Provider, Historical   L. ACIDOPHILUS/PECTIN, CITRUS (ACIDOPHILUS PROBIOTIC ORAL) Take 1 capsule by mouth once daily.    Provider, Historical   levocetirizine (XYZAL) 5 MG tablet Take 5 mg by mouth every evening.    Provider, Historical   menthol-camphor-irrit cntr ir1 3-3 % Gel Apply 1 application topically as needed.    Provider, Historical   olmesartan-hydrochlorothiazide (BENICAR HCT) 40-25 mg per tablet TAKE 1 TABLET BY MOUTH EVERY MORNING 10/24/23   Mikaela Tidwell FNP   prednisoLONE acetate (PRED FORTE) 1 % DrpS     Provider, Historical   RESTASIS 0.05 % ophthalmic emulsion INT 1 GTT IN OU BID 4/4/19   Provider, Historical   triamcinolone acetonide 0.1% (KENALOG) 0.1 % cream Apply topically. 1/11/24   Provider, Historical   varicella-zoster gE-AS01B, PF, (SHINGRIX, PF,) 50 mcg/0.5 mL injection     Provider, Historical       Review of Systems:  Review of Systems      Advance Care Planning     Date: 07/24/2024    Power of   I initiated the process of voluntary advance care planning today and explained the importance of this process to the patient.  I introduced the concept of advance directives to the patient, as well. Then the patient received detailed information about the importance of designating a Health Care Power of  (HCPOA). She was also instructed to communicate with this person about their wishes for future healthcare, should she become sick and lose decision-making capacity. The patient has previously appointed a HCPOA. After our discussion, the  "patient has decided to complete a HCPOA and has appointed her  on file , health care agent:  on file  & health care agent number:  on file . I encouraged her to communicate with this person about their wishes for future healthcare, should she become sick and lose decision-making capacity.      A total of 10 min was spent on advance care planning, goals of care discussion, emotional support, formulating and communicating prognosis and exploring burden/benefit of various approaches of treatment. This discussion occurred on a fully voluntary basis with the verbal consent of the patient and/or family.           PHYSICAL EXAM     BP (!) 150/74 (BP Location: Right arm, Patient Position: Sitting, BP Method: Medium (Manual))   Pulse 66   Temp 98.9 °F (37.2 °C) (Oral)   Ht 5' 4" (1.626 m)   Wt 90 kg (198 lb 6.6 oz)   LMP 08/04/1996   SpO2 95%   BMI 34.06 kg/m²     GEN - A+OX4, NAD   HEENT - PERRL, EOMI, OP clear  Neck - No thyromegaly or cervical LAD. No thyroid masses felt.  CV - RRR, no m/r   Chest - CTAB, no wheezing or rhonchi  Abd - S/NT/ND/+BS.   Ext - 2+BDP and radial pulses. No C/C/E.  Skin - No rash.    LABS       ASSESSMENT/PLAN     Meagan Flores is a 79 y.o. female with  1. Essential hypertension  Overview:  BP normally well controlled. To do BP log.   Limit salt intake   Encouraged more exercise       2. Lower extremity pain, medial, unspecified laterality  normal    3. Mixed hyperlipidemia  Overview:  Well controlled   Limit cholesterol in diet       4. Mild depression  Overview:  Stable and doing well   Discussed weaning off wellburtrin, pt will let us know if she decides  Denies SI/HI       5. Vitamin D deficiency  Overview:  Continue with vitamin d       6. Fatty liver  Routine monitoring.   Encouraged a low carb diet    7. Primary osteoarthritis involving multiple joints  Chronic and stable  Encouraged water aerobics     8. Primary osteoarthritis of both knees  Pain control with tylenol  Water " aerobics    9. Major depressive disorder, single episode, mild  Overview:  Stable and doing well   Discussed weaning off wellburtrin, pt will let us know if she decides  Denies SI/HI       10. Enlarged thyroid  Overview:  US thyroid ordered       11. Fullness of abdomen  -     US Abdomen Complete; Future; Expected date: 07/24/2024           WORRY SCORE 2    RTC in 3 months, sooner if needed and depending on labs.    Iris Cuellar MD  Board Certified Internist/Geriatrician  [facility] ([place])

## 2024-08-19 ENCOUNTER — TELEPHONE (OUTPATIENT)
Dept: PRIMARY CARE CLINIC | Facility: CLINIC | Age: 79
End: 2024-08-19
Payer: MEDICARE

## 2024-08-19 NOTE — TELEPHONE ENCOUNTER
Patient reports that she went to a conference of approximately 1000 women last week. Last night 8/18/24, she began having nasal congestion, runny nose, non-productive cough, and fever. She reports she has taken Ibuprofen and Tylenol for the fever, Coricidin for her congestion and has been applying Elver's salve to her nose and throat. I reviewed Dr. Cuellar's URI recs with her. She verbalized understanding. Will check back on patient.

## 2024-09-13 DIAGNOSIS — F32.0 MAJOR DEPRESSIVE DISORDER, SINGLE EPISODE, MILD: Primary | ICD-10-CM

## 2024-09-13 RX ORDER — BUPROPION HYDROCHLORIDE 150 MG/1
150 TABLET ORAL EVERY OTHER DAY
Qty: 15 TABLET | Refills: 0 | Status: SHIPPED | OUTPATIENT
Start: 2024-09-13 | End: 2024-10-13

## 2024-09-13 RX ORDER — BUPROPION HYDROCHLORIDE 150 MG/1
150 TABLET ORAL DAILY
COMMUNITY
End: 2024-09-13 | Stop reason: SDUPTHER

## 2024-09-13 NOTE — TELEPHONE ENCOUNTER
Called patient and reminded her that at her 7/24/24 visit, she wanted to wean off Rx, so MD decreased regimen to 3 days a week. Patient said she has been doing ok on that, but ran out of Rx and wasn't sure if MD wanted her to continue at 3 days a week, or to stop completely. Please advise.    LOV 7/24/24  NOV 10/28/24

## 2024-09-13 NOTE — TELEPHONE ENCOUNTER
----- Message from Sri Melendrez sent at 9/13/2024  9:36 AM CDT -----  Regarding: pt advice- meds discontinued  [phone] - call back ;  wanted to know why  discontinued this medication and if she can get it back needs need new script for      BUPROPION  MG TAB    SEND TO :      [id number] [address] [facility]  [address] [place] [id number]  Phone: [phone] Fax: [phone]  Hours: Not open 24 hours    Sri

## 2024-09-18 ENCOUNTER — TELEPHONE (OUTPATIENT)
Dept: PRIMARY CARE CLINIC | Facility: CLINIC | Age: 79
End: 2024-09-18
Payer: MEDICARE

## 2024-09-18 NOTE — TELEPHONE ENCOUNTER
Patient was informed of her abd us results and Dr. Cuellar's comments. She verbalized understanding.

## 2024-09-18 NOTE — TELEPHONE ENCOUNTER
----- Message from Iris Cuellar MD sent at 9/18/2024  9:50 AM CDT -----  No major abnormalities to account for the bloating. Fatty liver which is a common condition seen in patients with ETOH use, a few extra pounds on or diet. Limit any ETOH use, limit carbs and weight control

## 2024-10-28 ENCOUNTER — OFFICE VISIT (OUTPATIENT)
Dept: PRIMARY CARE CLINIC | Facility: CLINIC | Age: 79
End: 2024-10-28
Payer: MEDICARE

## 2024-10-28 VITALS
WEIGHT: 196.44 LBS | BODY MASS INDEX: 33.54 KG/M2 | HEIGHT: 64 IN | HEART RATE: 67 BPM | DIASTOLIC BLOOD PRESSURE: 72 MMHG | OXYGEN SATURATION: 95 % | TEMPERATURE: 99 F | SYSTOLIC BLOOD PRESSURE: 138 MMHG

## 2024-10-28 DIAGNOSIS — M17.0 PRIMARY OSTEOARTHRITIS OF BOTH KNEES: ICD-10-CM

## 2024-10-28 DIAGNOSIS — I10 ESSENTIAL HYPERTENSION: ICD-10-CM

## 2024-10-28 DIAGNOSIS — M15.0 PRIMARY OSTEOARTHRITIS INVOLVING MULTIPLE JOINTS: ICD-10-CM

## 2024-10-28 DIAGNOSIS — F32.0 MAJOR DEPRESSIVE DISORDER, SINGLE EPISODE, MILD: Primary | ICD-10-CM

## 2024-10-28 DIAGNOSIS — F32.A MILD DEPRESSION: ICD-10-CM

## 2024-10-28 DIAGNOSIS — E78.2 MIXED HYPERLIPIDEMIA: ICD-10-CM

## 2024-10-28 DIAGNOSIS — Z23 FLU VACCINE NEED: ICD-10-CM

## 2024-10-28 DIAGNOSIS — M79.606: ICD-10-CM

## 2024-10-28 DIAGNOSIS — K76.0 FATTY LIVER: ICD-10-CM

## 2024-10-28 DIAGNOSIS — E55.9 VITAMIN D DEFICIENCY: ICD-10-CM

## 2024-10-28 PROCEDURE — 99214 OFFICE O/P EST MOD 30 MIN: CPT | Mod: PBBFAC,PN,25 | Performed by: INTERNAL MEDICINE

## 2024-10-28 PROCEDURE — 99999 PR PBB SHADOW E&M-EST. PATIENT-LVL IV: CPT | Mod: PBBFAC,,, | Performed by: INTERNAL MEDICINE

## 2024-10-28 PROCEDURE — 99999PBSHW PR PBB SHADOW TECHNICAL ONLY FILED TO HB: Mod: PBBFAC,,,

## 2024-10-28 PROCEDURE — 90653 IIV ADJUVANT VACCINE IM: CPT | Mod: PBBFAC,PN

## 2024-10-28 PROCEDURE — G0008 ADMIN INFLUENZA VIRUS VAC: HCPCS | Mod: PBBFAC,PN

## 2024-10-28 PROCEDURE — 99214 OFFICE O/P EST MOD 30 MIN: CPT | Mod: S$PBB,,, | Performed by: INTERNAL MEDICINE

## 2024-10-28 RX ORDER — PREDNISONE 20 MG/1
20 TABLET ORAL DAILY
COMMUNITY
Start: 2024-10-18 | End: 2024-10-29

## 2024-10-28 RX ADMIN — INFLUENZA A VIRUS A/VICTORIA/4897/2022 IVR-238 (H1N1) ANTIGEN (FORMALDEHYDE INACTIVATED), INFLUENZA A VIRUS A/THAILAND/8/2022 IVR-237 (H3N2) ANTIGEN (FORMALDEHYDE INACTIVATED), INFLUENZA B VIRUS B/AUSTRIA/1359417/2021 BVR-26 ANTIGEN (FORMALDEHYDE INACTIVATED) 0.5 ML: 15; 15; 15 INJECTION, SUSPENSION INTRAMUSCULAR at 03:10

## 2024-11-07 DIAGNOSIS — M25.50 ARTHRALGIA, UNSPECIFIED JOINT: ICD-10-CM

## 2024-11-07 RX ORDER — IBUPROFEN 800 MG/1
800 TABLET ORAL 2 TIMES DAILY PRN
Qty: 60 TABLET | Refills: 2 | Status: SHIPPED | OUTPATIENT
Start: 2024-11-07

## 2024-11-07 NOTE — TELEPHONE ENCOUNTER
----- Message from Sri sent at 11/7/2024  9:01 AM CST -----  Regarding: script needed  [phone] - call back ; need refill       ibuprofen (ADVIL,MOTRIN) 800 MG tablet      Send to      [id number] [address] [facility]  [address] [place] [id number]  Phone: [phone] Fax: [phone]  Hours: Not open 24 hours    Sri

## 2024-12-05 ENCOUNTER — TELEPHONE (OUTPATIENT)
Dept: PRIMARY CARE CLINIC | Facility: CLINIC | Age: 79
End: 2024-12-05
Payer: MEDICARE

## 2024-12-05 NOTE — LETTER
December 5, 2024    Meagan Flores  [address]  [facility] [address]             [facility] [place]  [address] [facility] [address]  [facility] [[email or web address] [address]  [facility] [address]  Phone: [phone]  Fax: [phone] Dear Ms. Flores:    This letter is a reminder that your CBC, Comprehensive Metabolic Panel, LDL Cholesterol, and TSH tests are due. Please call our office to schedule an appointment.    Sincerely,        Iris Cuellar MD

## 2025-02-06 ENCOUNTER — TELEPHONE (OUTPATIENT)
Dept: PRIMARY CARE CLINIC | Facility: CLINIC | Age: 80
End: 2025-02-06
Payer: MEDICARE

## 2025-02-06 NOTE — TELEPHONE ENCOUNTER
Spoke with patient about her trip to the ED. She is worried about the abnormal endometrial thickening seen on abd CT. I offered patient several options for ED follow ups. She refused every one except 3/5/25 with Dr. Cuellar.

## 2025-02-21 DIAGNOSIS — Z00.00 ENCOUNTER FOR MEDICARE ANNUAL WELLNESS EXAM: ICD-10-CM

## 2025-03-05 ENCOUNTER — OFFICE VISIT (OUTPATIENT)
Dept: PRIMARY CARE CLINIC | Facility: CLINIC | Age: 80
End: 2025-03-05
Payer: MEDICARE

## 2025-03-05 ENCOUNTER — TELEPHONE (OUTPATIENT)
Dept: PRIMARY CARE CLINIC | Facility: CLINIC | Age: 80
End: 2025-03-05
Payer: MEDICARE

## 2025-03-05 VITALS
HEIGHT: 64 IN | TEMPERATURE: 98 F | BODY MASS INDEX: 32.93 KG/M2 | SYSTOLIC BLOOD PRESSURE: 134 MMHG | DIASTOLIC BLOOD PRESSURE: 74 MMHG | WEIGHT: 192.88 LBS | HEART RATE: 58 BPM | OXYGEN SATURATION: 97 %

## 2025-03-05 DIAGNOSIS — F32.0 MAJOR DEPRESSIVE DISORDER, SINGLE EPISODE, MILD: ICD-10-CM

## 2025-03-05 DIAGNOSIS — R93.5 ABNORMAL CT OF THE ABDOMEN: ICD-10-CM

## 2025-03-05 DIAGNOSIS — Z85.828 HISTORY OF SKIN CANCER: ICD-10-CM

## 2025-03-05 DIAGNOSIS — R93.89 ENDOMETRIAL THICKENING ON ULTRASOUND: ICD-10-CM

## 2025-03-05 DIAGNOSIS — I70.0 AORTIC ATHEROSCLEROSIS: Primary | ICD-10-CM

## 2025-03-05 PROBLEM — D03.9 MELANOMA IN SITU, UNSPECIFIED SITE: Status: RESOLVED | Noted: 2024-04-11 | Resolved: 2025-03-05

## 2025-03-05 PROCEDURE — 99214 OFFICE O/P EST MOD 30 MIN: CPT | Mod: S$PBB,,, | Performed by: INTERNAL MEDICINE

## 2025-03-05 PROCEDURE — 99999 PR PBB SHADOW E&M-EST. PATIENT-LVL V: CPT | Mod: PBBFAC,,, | Performed by: INTERNAL MEDICINE

## 2025-03-05 PROCEDURE — 99215 OFFICE O/P EST HI 40 MIN: CPT | Mod: PBBFAC,PN | Performed by: INTERNAL MEDICINE

## 2025-03-05 NOTE — PROGRESS NOTES
INTERNAL MEDICINE PROGRESS/URGENT CARE NOTE    CHIEF COMPLAINT     Chief Complaint   Patient presents with    Follow-up     Patient presents for ED FU from 2/5/25 for food poisoning. Patient stated her symptoms lasted x3 days and she is feeling better now.       HPI     Meagan Flores is a 80 y.o.  female who presents with a PMHx of  HTN, HLD, anxiety and depression, fatty liver, MVP, sciatica, melanoma who presents today for scheduled follow up to review her recent CT A/P         Her main complaints and concerns today are:   Recently went to the ER with c/o n/v. Had a CT abdomen and pelvis done. Incidentally found endometrial thickening and recommended a pelvic US. Will order and send to GYN.       Home Medications:  Prior to Admission medications    Medication Sig Start Date End Date Taking? Authorizing Provider   aspirin (ECOTRIN) 81 MG EC tablet Take 81 mg by mouth once daily.    Provider, Historical   carvediloL (COREG) 25 MG tablet TAKE 1 TABLET BY MOUTH TWICE DAILY 7/8/24   Iris Cuellar MD   cholecalciferol, vitamin D3, 5,000 unit capsule Take 5,000 Units by mouth once daily.    Provider, Historical   ciprofloxacin HCl (CILOXAN) 0.3 % ophthalmic solution INSTILL 1 DROP QID IN OPERATED EYE 3  DAYS BEFORE SURGERY    Provider, Historical   coQ10, ubiquinol, 100 mg Cap Take 1 capsule by mouth once daily.    Provider, Historical   diclofenac sodium (VOLTAREN) 1 % Gel Apply 4 g topically 3 (three) times daily as needed. 1/6/20   Sim Law MD   ibuprofen (ADVIL,MOTRIN) 800 MG tablet Take 1 tablet (800 mg total) by mouth 2 (two) times daily as needed for Pain. 11/7/24   Iris Cuellar MD   imiquimod (ALDARA) 5 % cream as needed.  10/23/19   Provider, Historical   L. ACIDOPHILUS/PECTIN, CITRUS (ACIDOPHILUS PROBIOTIC ORAL) Take 1 capsule by mouth once daily.    Provider, Historical   menthol-camphor-irrit cntr ir1 3-3 % Gel Apply 1 application topically as needed.    Provider, Historical  "  olmesartan-hydrochlorothiazide (BENICAR HCT) 40-25 mg per tablet TAKE 1 TABLET BY MOUTH EVERY MORNING 10/14/24   Iris Cuellar MD   ondansetron (ZOFRAN-ODT) 4 MG TbDL Take 1 tablet (4 mg total) by mouth every 8 (eight) hours as needed. 2/5/25   Mauro Maloney MD   RESTASIS 0.05 % ophthalmic emulsion INT 1 GTT IN OU BID 4/4/19   Provider, Historical   triamcinolone acetonide 0.1% (KENALOG) 0.1 % cream Apply topically. 1/11/24   Provider, Historical       Review of Systems:  Review of Systems      Date: 03/05/2025    Power of   I initiated the process of voluntary advance care planning today and explained the importance of this process to the patient.  I introduced the concept of advance directives to the patient, as well. Then the patient received detailed information about the importance of designating a Health Care Power of  (HCPOA). She was also instructed to communicate with this person about their wishes for future healthcare, should she become sick and lose decision-making capacity. The patient has previously appointed a HCPOA. After our discussion, the patient has decided to complete a HCPOA and has appointed her  family , health care agent:  on file  & health care agent number:  on file . I encouraged her to communicate with this person about their wishes for future healthcare, should she become sick and lose decision-making capacity.      A total of 10 min was spent on advance care planning, goals of care discussion, emotional support, formulating and communicating prognosis and exploring burden/benefit of various approaches of treatment. This discussion occurred on a fully voluntary basis with the verbal consent of the patient and/or family.       PHYSICAL EXAM     /74 (BP Location: Right arm, Patient Position: Sitting)   Pulse (!) 58   Temp 98.2 °F (36.8 °C)   Ht 5' 4" (1.626 m)   Wt 87.5 kg (192 lb 14.4 oz)   LMP 08/04/1996   SpO2 97%   BMI 33.11 kg/m²     GEN - " A+OX4, NAD     LABS       ASSESSMENT/PLAN     Meagan Flores is a 80 y.o. female with  1. Aortic atherosclerosis  Declines statine therapy. On daily ASA     2. Major depressive disorder, single episode, mild  Overview:  Stable and doing well   Discussed weaning off wellburtrin, pt will let us know if she decides  Denies SI/HI       3. History of skin cancer  Follows closely with dermatology    4. Abnormal CT of the abdomen  Referral to gyn. Will get follow up US     5. Endometrial thickening on ultrasound  -     US Pelvis Complete Non OB; Future; Expected date: 03/05/2025  -     Ambulatory referral/consult to Gynecology; Future; Expected date: 03/12/2025           WORRY SCORE 2    RTC in 3 months, sooner if needed and depending on labs.    Iris Cuellar MD  Board Certified Internist/Geriatrician  [facility] ([place])

## 2025-03-05 NOTE — TELEPHONE ENCOUNTER
Patient's ultrasounds scheduled for 3/10/25 @ [facility]. I have messaged Dr. Yazmin Philip's office to schedule the patient.

## 2025-03-10 ENCOUNTER — TELEPHONE (OUTPATIENT)
Dept: PRIMARY CARE CLINIC | Facility: CLINIC | Age: 80
End: 2025-03-10
Payer: MEDICARE

## 2025-03-10 ENCOUNTER — RESULTS FOLLOW-UP (OUTPATIENT)
Dept: PRIMARY CARE CLINIC | Facility: CLINIC | Age: 80
End: 2025-03-10

## 2025-03-10 NOTE — PROGRESS NOTES
Significantly thickened endometrial stripe this is abnormal for postmenopausal patient. Would consider endometrial biopsy

## 2025-03-10 NOTE — TELEPHONE ENCOUNTER
Patient was informed of her US results and Dr. Cuellar's recommendations. She verbalized understanding. Patient's appt with Dr. Philip is scheduled for 4/3/25.

## 2025-03-10 NOTE — TELEPHONE ENCOUNTER
----- Message from Iris Cuellar MD sent at 3/10/2025 12:59 PM CDT -----  Significantly thickened endometrial stripe this is abnormal for postmenopausal patient. Would consider endometrial biopsy  ----- Message -----  From: Interface, Rad Results In  Sent: 3/10/2025  12:29 PM CDT  To: Iris Cuellar MD

## 2025-03-14 ENCOUNTER — TELEPHONE (OUTPATIENT)
Dept: OBSTETRICS AND GYNECOLOGY | Facility: CLINIC | Age: 80
End: 2025-03-14
Payer: MEDICARE

## 2025-03-14 NOTE — TELEPHONE ENCOUNTER
Pt called wanting to know if she needs a biopsy done will it be done in the office.  Informed pt that we do biopsies in the office but its up to the provider  Pt verb understanding

## 2025-03-14 NOTE — TELEPHONE ENCOUNTER
----- Message from Yves sent at 3/14/2025  9:26 AM CDT -----  Type: Needs Medical AdviceWho Called:  ptBest Call Back Number: [phone] Information: pt is calling the office to speak with the nurse in regards to her appt on 04/03. Please call back to advise. Thanks!

## 2025-04-03 ENCOUNTER — OFFICE VISIT (OUTPATIENT)
Dept: OBSTETRICS AND GYNECOLOGY | Facility: CLINIC | Age: 80
End: 2025-04-03
Payer: MEDICARE

## 2025-04-03 VITALS
DIASTOLIC BLOOD PRESSURE: 72 MMHG | WEIGHT: 190.69 LBS | BODY MASS INDEX: 32.73 KG/M2 | SYSTOLIC BLOOD PRESSURE: 138 MMHG

## 2025-04-03 DIAGNOSIS — N81.11 CYSTOCELE, MIDLINE: ICD-10-CM

## 2025-04-03 DIAGNOSIS — R93.89 ENDOMETRIAL THICKENING ON ULTRASOUND: Primary | ICD-10-CM

## 2025-04-03 PROCEDURE — 99214 OFFICE O/P EST MOD 30 MIN: CPT | Mod: PBBFAC,PN | Performed by: OBSTETRICS & GYNECOLOGY

## 2025-04-03 PROCEDURE — 99203 OFFICE O/P NEW LOW 30 MIN: CPT | Mod: 25,S$PBB,, | Performed by: OBSTETRICS & GYNECOLOGY

## 2025-04-03 PROCEDURE — 99999 PR PBB SHADOW E&M-EST. PATIENT-LVL IV: CPT | Mod: PBBFAC,,, | Performed by: OBSTETRICS & GYNECOLOGY

## 2025-04-03 RX ORDER — NEOMYCIN SULFATE, POLYMYXIN B SULFATE, AND DEXAMETHASONE 3.5; 10000; 1 MG/G; [USP'U]/G; MG/G
OINTMENT OPHTHALMIC
COMMUNITY
Start: 2025-03-13

## 2025-04-03 NOTE — PROGRESS NOTES
Chief Complaint   Patient presents with    Endometrium Thickening       History of Present Illness: Meagan Flores is a 80 y.o. female that presents today 4/3/2025 for   Chief Complaint   Patient presents with    Endometrium Thickening     Incidental finding on ultrasound/CT    1.3 cm to 1.7 cm     No HRT    Menopause no HRT     Denies any bleeding    Desires sampling today    Past Medical History:   Diagnosis Date    Allergy     Anxiety     Arthritis     Cataract     COVID-19 2020    Endometrial polyp     Fatty liver     Hyperlipidemia 2015    Hyperlipidemia 2015    Hypertension 2015    Liver cyst     Mitral valve disorder     Sciatica        Past Surgical History:   Procedure Laterality Date    COLONOSCOPY  2014    Dr. Mazariegos    DILATION AND CURETTAGE OF UTERUS      EYE SURGERY      cataract       Medications Prior to Visit[1]    Review of patient's allergies indicates:  No Known Allergies    Family History   Problem Relation Name Age of Onset    Heart disease Father      Cancer Mother          breast    Colon cancer Neg Hx      Ovarian cancer Neg Hx      Uterine cancer Neg Hx         Social History     Tobacco Use    Smoking status: Never    Smokeless tobacco: Never   Substance Use Topics    Alcohol use: Yes     Comment: rarely     Social History     Substance and Sexual Activity   Sexual Activity Not Currently    Birth control/protection: Post-menopausal       OB History    Para Term  AB Living   0 0 0 0 0 0   SAB IAB Ectopic Multiple Live Births   0 0 0 0          /72 (Patient Position: Sitting)   Wt 86.5 kg (190 lb 11.2 oz)   LMP 1996   Body mass index is 32.73 kg/m².    PE:   APPEARANCE: Well nourished, well developed, in no acute distress.  SKIN: Normal skin turgor, no lesions.  NECK: Neck symmetric without masses or thyromegaly.ABDOMEN: Soft. No tenderness or masses. No hepatosplenomegaly. No hernias.  PELVIC: Normal external female genitalia without  lesions. Normal hair distribution. Adequate perineal body, normal urethral meatus. Normal palpable bladder and urethra. Vagina moist and well rugated without lesions or discharge. Grade 2 cystocele,  Cervix pink and without lesions. Bimanual exam showed uterus normal size, position, mobile and nontender. Adnexa without masses or tenderness.   EXTREMITIES: NO clubbing cyanosis or edema        ASSESSMENT/PLAN:  Endometrial thickening on ultrasound  -     Ambulatory referral/consult to Gynecology  -     Specimen to Pathology Gynecology and Obstetrics    Cystocele, midline      30 minutes spent today spent preparing reviewing previous external notes, reviewing previous results, performing medical examination, ordering tests or medications, counseling and documenting.         ENDOMETRIAL BIOPSY:    Female patient presents for an endometrial biopsy due to abnormal bleeding.      UPT is negative.    PRE ENDOMETRIAL BIOPSY COUNSELING:  The patient was informed of the risk of bleeding, infection, uterine perforation and pain and that the test will rule-out endometrial cancer with accuracy greater than 95%. She was counseled on the alternatives to endometrial biopsy and agrees to proceed.    PROCEDURE:  TIME OUT PERFORMED.  The cervix was visualized with a speculum.  A single tooth tenaculum was placed on the anterior lip prior to the biopsy.  A sterile endometrial pipelle was passed without difficulty to a depth of 8 cm.  Adequate endometrial tissue was obtained.    The specimen was placed in formalyn and sent to Pathology for histology evaluation.  The patient tolerated the procedure well.    ASSESSMENT:   Abnormal uterine bleeding  626.8.    POST ENDOMETRIAL BIOPSY COUNSELING:  Manage post biopsy cramping with NSAIDs or Tylenol.  Expect spotting or light bleeding for a few days.  Report bleeding heavier than a period, fever > 101.0 F, worsening pain or a foul smelling vaginal discharge.    Counseling lasted approximately  15 minutes and all her questions were answered.    FOLLOW-UP: Pending biopsy results.         [1]   Outpatient Medications Prior to Visit   Medication Sig Dispense Refill    aspirin (ECOTRIN) 81 MG EC tablet Take 81 mg by mouth once daily.      carvediloL (COREG) 25 MG tablet TAKE 1 TABLET BY MOUTH TWICE DAILY 180 tablet 3    cholecalciferol, vitamin D3, 5,000 unit capsule Take 5,000 Units by mouth once daily.      coQ10, ubiquinol, 100 mg Cap Take 1 capsule by mouth once daily.      imiquimod (ALDARA) 5 % cream as needed.   0    L. ACIDOPHILUS/PECTIN, CITRUS (ACIDOPHILUS PROBIOTIC ORAL) Take 1 capsule by mouth once daily.      menthol-camphor-irrit cntr ir1 3-3 % Gel Apply 1 application topically as needed.      neomycin-polymyxin-dexamethasone (DEXACINE) 3.5 mg/g-10,000 unit/g-0.1 % Oint SMARTSIG:sparingly In Eye(s) 4 Times Daily      olmesartan-hydrochlorothiazide (BENICAR HCT) 40-25 mg per tablet TAKE 1 TABLET BY MOUTH EVERY MORNING 90 tablet 3    RESTASIS 0.05 % ophthalmic emulsion INT 1 GTT IN OU BID  0    diclofenac sodium (VOLTAREN) 1 % Gel Apply 4 g topically 3 (three) times daily as needed. 4 Tube 1    ibuprofen (ADVIL,MOTRIN) 800 MG tablet Take 1 tablet (800 mg total) by mouth 2 (two) times daily as needed for Pain. 60 tablet 2    ondansetron (ZOFRAN-ODT) 4 MG TbDL Take 1 tablet (4 mg total) by mouth every 8 (eight) hours as needed. 18 tablet 0    triamcinolone acetonide 0.1% (KENALOG) 0.1 % cream Apply topically.       No facility-administered medications prior to visit.

## 2025-04-07 ENCOUNTER — RESULTS FOLLOW-UP (OUTPATIENT)
Dept: OBSTETRICS AND GYNECOLOGY | Facility: CLINIC | Age: 80
End: 2025-04-07

## 2025-04-09 ENCOUNTER — TELEPHONE (OUTPATIENT)
Dept: OBSTETRICS AND GYNECOLOGY | Facility: CLINIC | Age: 80
End: 2025-04-09
Payer: MEDICARE

## 2025-04-09 NOTE — TELEPHONE ENCOUNTER
----- Message from Jose Miguel Patel sent at 4/8/2025  4:15 PM CDT -----  Patient would like a call back regarding advice given for repeat U/S. Patient is also complaining of bleeding than began today 04/08/2025.  ----- Message -----  From: Karen Manning  Sent: 4/8/2025   3:58 PM CDT  To: [facility] Staff    Type:  Needs Medical AdviceWho Called:  pt Would the patient rather a call back or a response via [facility]?  Call back Best Call Back Number:  [phone] Information: pt is calling to get a return call from this office in regards to the test results that she received on 04/07/25

## 2025-04-15 ENCOUNTER — PATIENT MESSAGE (OUTPATIENT)
Dept: OBSTETRICS AND GYNECOLOGY | Facility: CLINIC | Age: 80
End: 2025-04-15

## 2025-04-15 ENCOUNTER — TELEPHONE (OUTPATIENT)
Dept: OBSTETRICS AND GYNECOLOGY | Facility: CLINIC | Age: 80
End: 2025-04-15

## 2025-04-15 ENCOUNTER — OFFICE VISIT (OUTPATIENT)
Dept: OBSTETRICS AND GYNECOLOGY | Facility: CLINIC | Age: 80
End: 2025-04-15
Payer: MEDICARE

## 2025-04-15 VITALS
DIASTOLIC BLOOD PRESSURE: 68 MMHG | BODY MASS INDEX: 32.81 KG/M2 | WEIGHT: 191.13 LBS | SYSTOLIC BLOOD PRESSURE: 130 MMHG

## 2025-04-15 DIAGNOSIS — R93.89 ENDOMETRIAL THICKENING ON ULTRASOUND: Primary | ICD-10-CM

## 2025-04-15 DIAGNOSIS — N95.0 POSTMENOPAUSAL BLEEDING: ICD-10-CM

## 2025-04-15 DIAGNOSIS — N84.0 ENDOMETRIAL POLYP: ICD-10-CM

## 2025-04-15 PROCEDURE — 99215 OFFICE O/P EST HI 40 MIN: CPT | Mod: PBBFAC,PN | Performed by: OBSTETRICS & GYNECOLOGY

## 2025-04-15 PROCEDURE — 99999 PR PBB SHADOW E&M-EST. PATIENT-LVL V: CPT | Mod: PBBFAC,,, | Performed by: OBSTETRICS & GYNECOLOGY

## 2025-04-15 PROCEDURE — 99213 OFFICE O/P EST LOW 20 MIN: CPT | Mod: S$PBB,,, | Performed by: OBSTETRICS & GYNECOLOGY

## 2025-04-15 NOTE — TELEPHONE ENCOUNTER
[place]  Preop with PCP pending  OPP 5/12 labs  Consents same day  Notify       We have your surgery set 1200 5/19    It will take place at the [facility]    Located at [address] [facility] [address] [address]     You must arrive 1 hour prior to surgery.    Nothing to EAT or DRINK after midnight.    No ASPIRIN for 7 days prior to surgery.     No injectable medications 10 days  prior to surgery     Your PREOP appointment is set    It will take place at the     [facility]    [address] [facility] [address] [place] [address]

## 2025-04-15 NOTE — PROGRESS NOTES
Chief Complaint   Patient presents with    Discuss D&C       History of Present Illness: Meagan Flores is a 80 y.o. female that presents today 4/15/2025 for   Chief Complaint   Patient presents with    Discuss D&C     She reports every 3-4 days of bleeding since 4/3/25 biopsy.  No prior bleeding since this EMB    She had USG 3/25 with Dr. Cuellar with thickened endometrium at 17 mm        Past Medical History:   Diagnosis Date    Allergy     Anxiety     Arthritis     Cataract     COVID-19 2020    Endometrial polyp     Fatty liver     Hyperlipidemia 2015    Hyperlipidemia 2015    Hypertension 2015    Liver cyst     Mitral valve disorder     Sciatica        Past Surgical History:   Procedure Laterality Date    COLONOSCOPY  2014    Dr. Mazariegos    DILATION AND CURETTAGE OF UTERUS      EYE SURGERY      cataract       Medications Prior to Visit[1]    Review of patient's allergies indicates:  No Known Allergies    Family History   Problem Relation Name Age of Onset    Heart disease Father      Cancer Mother          breast    Colon cancer Neg Hx      Ovarian cancer Neg Hx      Uterine cancer Neg Hx         Social History     Tobacco Use    Smoking status: Never    Smokeless tobacco: Never   Substance Use Topics    Alcohol use: Yes     Comment: rarely     Social History     Substance and Sexual Activity   Sexual Activity Not Currently    Birth control/protection: Post-menopausal       OB History    Para Term  AB Living   0 0 0 0 0 0   SAB IAB Ectopic Multiple Live Births   0 0 0 0          /68 (Patient Position: Sitting)   Wt 86.7 kg (191 lb 2.2 oz)   LMP 1996   Body mass index is 32.81 kg/m².      ASSESSMENT/PLAN:  Endometrial thickening on ultrasound  -     Case Request Operating Room: HYSTEROSCOPY, WITH DILATION AND CURETTAGE OF UTERUS    Endometrial polyp  -     Case Request Operating Room: HYSTEROSCOPY, WITH DILATION AND CURETTAGE OF UTERUS    Postmenopausal  bleeding  -     Case Request Operating Room: HYSTEROSCOPY, WITH DILATION AND CURETTAGE OF UTERUS      She plans to do her PREOP clearance EKG/CXR on 4/28 [place] [place] [facility] [place]     I will sign consents same day with her.        20 minutes spent today preparing reviewing previous external notes, reviewing previous results, performing medical examination, orders tests or medications, counseling and documenting.            [1]   Outpatient Medications Prior to Visit   Medication Sig Dispense Refill    aspirin (ECOTRIN) 81 MG EC tablet Take 81 mg by mouth once daily.      carvediloL (COREG) 25 MG tablet TAKE 1 TABLET BY MOUTH TWICE DAILY 180 tablet 3    cholecalciferol, vitamin D3, 5,000 unit capsule Take 5,000 Units by mouth once daily.      coQ10, ubiquinol, 100 mg Cap Take 1 capsule by mouth once daily.      imiquimod (ALDARA) 5 % cream as needed.   0    L. ACIDOPHILUS/PECTIN, CITRUS (ACIDOPHILUS PROBIOTIC ORAL) Take 1 capsule by mouth once daily.      menthol-camphor-irrit cntr ir1 3-3 % Gel Apply 1 application topically as needed.      neomycin-polymyxin-dexamethasone (DEXACINE) 3.5 mg/g-10,000 unit/g-0.1 % Oint SMARTSIG:sparingly In Eye(s) 4 Times Daily      olmesartan-hydrochlorothiazide (BENICAR HCT) 40-25 mg per tablet TAKE 1 TABLET BY MOUTH EVERY MORNING 90 tablet 3    RESTASIS 0.05 % ophthalmic emulsion INT 1 GTT IN OU BID  0    diclofenac sodium (VOLTAREN) 1 % Gel Apply 4 g topically 3 (three) times daily as needed. 4 Tube 1    ibuprofen (ADVIL,MOTRIN) 800 MG tablet Take 1 tablet (800 mg total) by mouth 2 (two) times daily as needed for Pain. 60 tablet 2    ondansetron (ZOFRAN-ODT) 4 MG TbDL Take 1 tablet (4 mg total) by mouth every 8 (eight) hours as needed. 18 tablet 0    triamcinolone acetonide 0.1% (KENALOG) 0.1 % cream Apply topically.       No facility-administered medications prior to visit.

## 2025-04-25 ENCOUNTER — RESULTS FOLLOW-UP (OUTPATIENT)
Dept: PRIMARY CARE CLINIC | Facility: CLINIC | Age: 80
End: 2025-04-25

## 2025-04-28 ENCOUNTER — OFFICE VISIT (OUTPATIENT)
Dept: PRIMARY CARE CLINIC | Facility: CLINIC | Age: 80
End: 2025-04-28
Payer: MEDICARE

## 2025-04-28 VITALS
TEMPERATURE: 98 F | BODY MASS INDEX: 32.78 KG/M2 | HEIGHT: 64 IN | SYSTOLIC BLOOD PRESSURE: 132 MMHG | HEART RATE: 62 BPM | DIASTOLIC BLOOD PRESSURE: 78 MMHG | OXYGEN SATURATION: 98 % | WEIGHT: 192 LBS

## 2025-04-28 DIAGNOSIS — F32.0 MAJOR DEPRESSIVE DISORDER, SINGLE EPISODE, MILD: ICD-10-CM

## 2025-04-28 DIAGNOSIS — E55.9 VITAMIN D DEFICIENCY: ICD-10-CM

## 2025-04-28 DIAGNOSIS — I10 ESSENTIAL HYPERTENSION: ICD-10-CM

## 2025-04-28 DIAGNOSIS — E78.2 MIXED HYPERLIPIDEMIA: ICD-10-CM

## 2025-04-28 DIAGNOSIS — I70.0 AORTIC ATHEROSCLEROSIS: Primary | ICD-10-CM

## 2025-04-28 DIAGNOSIS — K76.0 FATTY LIVER: ICD-10-CM

## 2025-04-28 PROCEDURE — 99999 PR PBB SHADOW E&M-EST. PATIENT-LVL IV: CPT | Mod: PBBFAC,,, | Performed by: INTERNAL MEDICINE

## 2025-04-28 PROCEDURE — 99214 OFFICE O/P EST MOD 30 MIN: CPT | Mod: PBBFAC,PN | Performed by: INTERNAL MEDICINE

## 2025-04-28 PROCEDURE — 99214 OFFICE O/P EST MOD 30 MIN: CPT | Mod: S$PBB,,, | Performed by: INTERNAL MEDICINE

## 2025-04-28 NOTE — PROGRESS NOTES
INTERNAL MEDICINE PROGRESS/URGENT CARE NOTE    CHIEF COMPLAINT     Chief Complaint   Patient presents with    Follow-up     Patient presents for her 6 month follow up appointment.       HPI     Meagan Flores is a 79 y.o.  female who presents with a PMHx of  HTN, HLD, anxiety and depression, fatty liver, MVP, sciatica, melanoma who presents today for routine follow up.         Her main complaints and concerns today are:   Recently went to the ER with c/o n/v. Had a CT abdomen and pelvis done. Incidentally found endometrial thickening and recommended a pelvic US. Will order and send to GYN. Now has a hysteroscopy scheduled.      At her previous appointment, marlonguevara discussed:   Abdominal bloating. No pain. No change in bowels but has family history of RCC in her mom so a bit concerned. Will order US         ANXIETY: says her mood is good. Was started on wellbutrin 15 years ago for relationship issues. Told patient she may try to wean off and see how   GAD7 not done.   Denies SI/HI     Depression: see above for mood.      History of skin cancer: her last visit with dermatologist was less than 6 months ago.         Home Medications:  Prior to Admission medications    Medication Sig Start Date End Date Taking? Authorizing Provider   aspirin (ECOTRIN) 81 MG EC tablet Take 81 mg by mouth once daily.    Provider, Historical   carvediloL (COREG) 25 MG tablet TAKE 1 TABLET BY MOUTH TWICE DAILY 7/8/24   Iris Cuellar MD   cholecalciferol, vitamin D3, 5,000 unit capsule Take 5,000 Units by mouth once daily.    Provider, Historical   coQ10, ubiquinol, 100 mg Cap Take 1 capsule by mouth once daily.    Provider, Historical   diclofenac sodium (VOLTAREN) 1 % Gel Apply 4 g topically 3 (three) times daily as needed. 1/6/20   Sim Law MD   ibuprofen (ADVIL,MOTRIN) 800 MG tablet Take 1 tablet (800 mg total) by mouth 2 (two) times daily as needed for Pain. 11/7/24   Iris Cuellar MD   imiquimod (ALDARA) 5  % cream as needed.  10/23/19   Provider, Historical   L. ACIDOPHILUS/PECTIN, CITRUS (ACIDOPHILUS PROBIOTIC ORAL) Take 1 capsule by mouth once daily.    Provider, Historical   menthol-camphor-irrit cntr ir1 3-3 % Gel Apply 1 application topically as needed.    Provider, Historical   neomycin-polymyxin-dexamethasone (DEXACINE) 3.5 mg/g-10,000 unit/g-0.1 % Oint SMARTSIG:sparingly In Eye(s) 4 Times Daily 3/13/25   Provider, Historical   olmesartan-hydrochlorothiazide (BENICAR HCT) 40-25 mg per tablet TAKE 1 TABLET BY MOUTH EVERY MORNING 10/14/24   Iris Cuellar MD   ondansetron (ZOFRAN-ODT) 4 MG TbDL Take 1 tablet (4 mg total) by mouth every 8 (eight) hours as needed. 2/5/25   Mauro Maloney MD   RESTASIS 0.05 % ophthalmic emulsion INT 1 GTT IN OU BID 4/4/19   Provider, Historical   triamcinolone acetonide 0.1% (KENALOG) 0.1 % cream Apply topically. 1/11/24   Provider, Historical       Review of Systems:  Review of Systems        Advance Care Planning     Date: 04/28/2025    Power of   I initiated the process of voluntary advance care planning today and explained the importance of this process to the patient.  I introduced the concept of advance directives to the patient, as well. Then the patient received detailed information about the importance of designating a Health Care Power of  (HCPOA). She was also instructed to communicate with this person about their wishes for future healthcare, should she become sick and lose decision-making capacity. The patient has previously appointed a HCPOA. After our discussion, the patient has decided to complete a HCPOA and has appointed her  chip sanchez health care agent:  on file  & health care agent number:  on file . I encouraged her to communicate with this person about their wishes for future healthcare, should she become sick and lose decision-making capacity.      A total of 10 min was spent on advance care planning, goals of care discussion,  "emotional support, formulating and communicating prognosis and exploring burden/benefit of various approaches of treatment. This discussion occurred on a fully voluntary basis with the verbal consent of the patient and/or family.             PHYSICAL EXAM     /78 (BP Location: Left arm, Patient Position: Sitting)   Pulse 62   Temp 98.1 °F (36.7 °C)   Ht 5' 4" (1.626 m)   Wt 87.1 kg (192 lb 0.3 oz)   LMP 08/04/1996   SpO2 98%   BMI 32.96 kg/m²     GEN - A+OX4, NAD   HEENT - PERRL, EOMI, OP clear  Neck - No thyromegaly or cervical LAD. No thyroid masses felt.  CV - RRR, no m/r   Chest - CTAB, no wheezing or rhonchi  Abd - S/NT/ND/+BS.   Ext - 2+BDP and radial pulses. No C/C/E.  Skin - No rash.    LABS         ASSESSMENT/PLAN     Meagan Flores is a 80 y.o. female with  1. Aortic atherosclerosis  Cholesterol is very well controlled  Continue with current diet    2. Major depressive disorder, single episode, mild  Overview:  Stable and doing well   Discussed weaning off wellburtrin, pt will let us know if she decides  Denies SI/HI       3. Essential hypertension  Overview:  BP well controlled. Continue current medications   Limit salt intake   Encouraged more exercise       4. Mixed hyperlipidemia  Overview:  Well controlled   Limit cholesterol in diet       5. Vitamin D deficiency  Overview:  Continue with vitamin d       6. Fatty liver  Encouraged a healthy diet with low carb   Weight management            WORRY SCORE 2    RTC in 6 months, sooner if needed and depending on labs.    Iris Cuellar MD  Board Certified Internist/Geriatrician  [facility] ([place]      "

## 2025-05-09 DIAGNOSIS — N84.0 ENDOMETRIAL POLYP: ICD-10-CM

## 2025-05-09 DIAGNOSIS — R93.89 ENDOMETRIAL THICKENING ON ULTRASOUND: ICD-10-CM

## 2025-05-09 DIAGNOSIS — N95.0 POSTMENOPAUSAL BLEEDING: Primary | ICD-10-CM

## 2025-05-09 RX ORDER — FAMOTIDINE 20 MG/1
20 TABLET, FILM COATED ORAL
Status: SHIPPED | OUTPATIENT
Start: 2025-05-09

## 2025-05-09 RX ORDER — SODIUM CHLORIDE, SODIUM LACTATE, POTASSIUM CHLORIDE, CALCIUM CHLORIDE 600; 310; 30; 20 MG/100ML; MG/100ML; MG/100ML; MG/100ML
INJECTION, SOLUTION INTRAVENOUS CONTINUOUS
OUTPATIENT
Start: 2025-05-09

## 2025-05-13 ENCOUNTER — E-CONSULT (OUTPATIENT)
Dept: CARDIOLOGY | Facility: CLINIC | Age: 80
End: 2025-05-13
Payer: MEDICARE

## 2025-05-13 ENCOUNTER — PATIENT MESSAGE (OUTPATIENT)
Dept: PRIMARY CARE CLINIC | Facility: CLINIC | Age: 80
End: 2025-05-13
Payer: MEDICARE

## 2025-05-13 ENCOUNTER — PATIENT MESSAGE (OUTPATIENT)
Dept: OBSTETRICS AND GYNECOLOGY | Facility: CLINIC | Age: 80
End: 2025-05-13
Payer: MEDICARE

## 2025-05-13 DIAGNOSIS — N84.0 ENDOMETRIAL POLYP: ICD-10-CM

## 2025-05-13 DIAGNOSIS — R94.31 ABNORMAL EKG: Primary | ICD-10-CM

## 2025-05-13 DIAGNOSIS — N95.0 POSTMENOPAUSAL BLEEDING: ICD-10-CM

## 2025-05-13 DIAGNOSIS — R93.89 ENDOMETRIAL THICKENING ON ULTRASOUND: ICD-10-CM

## 2025-05-13 DIAGNOSIS — Z01.810 PRE-OPERATIVE CARDIOVASCULAR EXAMINATION: Primary | ICD-10-CM

## 2025-05-13 DIAGNOSIS — R94.31 ABNORMAL EKG: ICD-10-CM

## 2025-05-13 PROCEDURE — 99451 NTRPROF PH1/NTRNET/EHR 5/>: CPT | Mod: S$PBB,,, | Performed by: INTERNAL MEDICINE

## 2025-05-13 NOTE — CONSULTS
[place] - Cardiology  Response for E-Consult     Patient Name: Meagan Flores  MRN: [id number]  Primary Care Provider: Iris Cuellar MD   Requesting Provider: Yazmin Philip, *  E-Consult to General Cardiology  Consult performed by: Kiran Melton MD  Consult ordered by: Yazmin Philip MD          Chart reviewed personally.  EKG reviewed personally.  Most recent echo reviewed personally.    No evidence seen in Epic of percutaneous coronary or peripheral arterial intervention within the last year.    As long as no significant chest pain or shortness of breath with exertion and no dizziness, lightheadedness, presyncope, or syncope, patient is at acceptable risk to proceed from a Cardiology standpoint.      Okay to hold aspirin 5-7 days prior to procedure, restart as soon as safe postprocedure.      Additional future steps to consider: NA    Total time of Consultation: 5 minute    I did not speak to the requesting provider verbally about this.     *This eConsult is based on the clinical data available to me and is furnished without benefit of a physical examination. The eConsult will need to be interpreted in light of any clinical issues or changes in patient status not available to me at the time of filing this eConsults. Significant changes in patient condition or level of acuity should result in immediate formal consultation and reevaluation. Please alert me if you have further questions.    Thank you for this eConsult referral.     Kiran Melton MD  [facility] Cardiology

## 2025-05-14 NOTE — TELEPHONE ENCOUNTER
Patient had a preop EKG done which indicated SB with 1st degree AVB and nonspecific ST abnormality. She was referred to Dr. Beck for abnl EKG. Patient wants Dr. Cuellar to tell her what the problem is.

## 2025-05-19 PROBLEM — N84.0 ENDOMETRIAL POLYP: Status: ACTIVE | Noted: 2025-05-19

## 2025-05-19 PROBLEM — N95.0 POSTMENOPAUSAL BLEEDING: Status: ACTIVE | Noted: 2025-05-19

## 2025-05-22 ENCOUNTER — PATIENT MESSAGE (OUTPATIENT)
Dept: OBSTETRICS AND GYNECOLOGY | Facility: CLINIC | Age: 80
End: 2025-05-22
Payer: MEDICARE

## 2025-07-04 DIAGNOSIS — I10 ESSENTIAL HYPERTENSION: ICD-10-CM

## 2025-07-07 ENCOUNTER — OFFICE VISIT (OUTPATIENT)
Dept: PRIMARY CARE CLINIC | Facility: CLINIC | Age: 80
End: 2025-07-07
Payer: MEDICARE

## 2025-07-07 ENCOUNTER — TELEPHONE (OUTPATIENT)
Dept: PRIMARY CARE CLINIC | Facility: CLINIC | Age: 80
End: 2025-07-07

## 2025-07-07 VITALS
SYSTOLIC BLOOD PRESSURE: 142 MMHG | WEIGHT: 188.94 LBS | HEIGHT: 65 IN | DIASTOLIC BLOOD PRESSURE: 72 MMHG | OXYGEN SATURATION: 96 % | HEART RATE: 74 BPM | TEMPERATURE: 98 F | BODY MASS INDEX: 31.48 KG/M2

## 2025-07-07 DIAGNOSIS — H61.21 IMPACTED CERUMEN OF RIGHT EAR: ICD-10-CM

## 2025-07-07 DIAGNOSIS — H93.8X1 EAR FULLNESS, RIGHT: Primary | ICD-10-CM

## 2025-07-07 PROCEDURE — 99213 OFFICE O/P EST LOW 20 MIN: CPT | Mod: S$PBB,,, | Performed by: INTERNAL MEDICINE

## 2025-07-07 PROCEDURE — 99999 PR PBB SHADOW E&M-EST. PATIENT-LVL IV: CPT | Mod: PBBFAC,,, | Performed by: INTERNAL MEDICINE

## 2025-07-07 PROCEDURE — 99214 OFFICE O/P EST MOD 30 MIN: CPT | Mod: PBBFAC,PN | Performed by: INTERNAL MEDICINE

## 2025-07-07 RX ORDER — BETAMETHASONE DIPROPIONATE 0.5 MG/G
1 CREAM TOPICAL DAILY
COMMUNITY
Start: 2025-06-12

## 2025-07-07 RX ORDER — CARVEDILOL 25 MG/1
25 TABLET ORAL 2 TIMES DAILY
Qty: 180 TABLET | Refills: 3 | Status: SHIPPED | OUTPATIENT
Start: 2025-07-07

## 2025-07-07 NOTE — PROGRESS NOTES
"INTERNAL MEDICINE PROGRESS/URGENT CARE NOTE    CHIEF COMPLAINT     Chief Complaint   Patient presents with    Follow-up     Patient presents for eval of right ear fullness and tightness in area behind ear x1 week.        HPI     Meagan Flores is a 80 y.o.  female who presents for an urgent/follow up visit today.  Patient is here today for "right ear fullness". Called in this am having noted a fullness in her right ear. Feels like its filled wi th water but no water. Has had cerumen impaction before cleaned by us   Here to have it evaluated today and indeed did have a lot of wax in the left ear. 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 Authorizing Provider   aspirin (ECOTRIN) 81 MG EC tablet Take 81 mg by mouth once daily.   Yes Provider, Historical   betamethasone dipropionate 0.05 % cream Apply 1 application  topically once daily. 6/12/25  Yes Provider, Historical   carvediloL (COREG) 25 MG tablet TAKE 1 TABLET BY MOUTH TWICE DAILY 7/7/25  Yes Iris Cuellar MD   cholecalciferol, vitamin D3, 5,000 unit capsule Take 5,000 Units by mouth once daily.   Yes Provider, Historical   coQ10, ubiquinol, 100 mg Cap Take 1 capsule by mouth once daily.   Yes Provider, Historical   ibuprofen (ADVIL,MOTRIN) 800 MG tablet Take 1 tablet (800 mg total) by mouth every 8 (eight) hours as needed for Pain. 5/19/25  Yes Yazmin Philip MD   imiquimod (ALDARA) 5 % cream Apply 1 Application " "topically as needed (Skin growths). 10/23/19  Yes Provider, Historical   L. ACIDOPHILUS/PECTIN, CITRUS (ACIDOPHILUS PROBIOTIC ORAL) Take 1 capsule by mouth once daily.   Yes Provider, Historical   menthol-camphor-irrit cntr ir1 3-3 % Gel Apply 1 application topically as needed.   Yes Provider, Historical   neomycin-polymyxin-dexamethasone (DEXACINE) 3.5 mg/g-10,000 unit/g-0.1 % Oint Place 1 drop into both eyes every 6 (six) hours. 3/13/25  Yes Provider, Historical   olmesartan-hydrochlorothiazide (BENICAR HCT) 40-25 mg per tablet TAKE 1 TABLET BY MOUTH EVERY MORNING 10/14/24  Yes Iris Cuellar MD   oxyCODONE-acetaminophen (PERCOCET) 5-325 mg per tablet Take 1 tablet by mouth every 4 (four) hours as needed for Pain. 5/19/25  Yes Yazmin Philip MD   RESTASIS 0.05 % ophthalmic emulsion Place 1 drop into both eyes 2 (two) times daily. 4/4/19  Yes Provider, Historical   TURMERIC ORAL Take 1 capsule by mouth 2 (two) times a day.   Yes Provider, Historical   carvediloL (COREG) 25 MG tablet TAKE 1 TABLET BY MOUTH TWICE DAILY 7/8/24 7/7/25  Iris Cuellar MD       Review of Systems:  Review of Systems        PHYSICAL EXAM     BP (!) 142/72 (BP Location: Right arm, Patient Position: Sitting)   Pulse 74   Temp 98.1 °F (36.7 °C)   Ht 5' 4.5" (1.638 m)   Wt 85.7 kg (188 lb 15 oz)   LMP 08/04/1996   SpO2 96%   BMI 31.93 kg/m²     GEN - A+OX4, NAD   HEENT -left ear with large amount of wax. Right ear with some wax but occlusion of the ear canal.       LABS       ASSESSMENT/PLAN     Meagan Flores is a 80 y.o. female with  1. Ear fullness, right  Cerumen impaction removed with curette     2. Impacted cerumen of right ear  Removed            WORRY SCORE 1  RTC in 6 months, sooner if needed and depending on labs.    Iris Cuellar MD  Board Certified Internist/Geriatrician  [facility]      "

## 2025-07-07 NOTE — TELEPHONE ENCOUNTER
Patient feels like there is water in her ear, but says there is no water in there. She denies any pain. This has been going on for approximately one week. She had had some nasal congestion for which she took Coricidin HBP. Patient scheduled to see Dr. Cuellar at 1:20 today.